# Patient Record
Sex: FEMALE | Race: WHITE | Employment: UNEMPLOYED | URBAN - METROPOLITAN AREA
[De-identification: names, ages, dates, MRNs, and addresses within clinical notes are randomized per-mention and may not be internally consistent; named-entity substitution may affect disease eponyms.]

---

## 2018-08-25 ENCOUNTER — HOSPITAL ENCOUNTER (EMERGENCY)
Age: 43
Discharge: HOME | End: 2018-08-25
Payer: COMMERCIAL

## 2018-08-25 VITALS
DIASTOLIC BLOOD PRESSURE: 54 MMHG | HEART RATE: 78 BPM | WEIGHT: 235 LBS | OXYGEN SATURATION: 97 % | SYSTOLIC BLOOD PRESSURE: 108 MMHG | RESPIRATION RATE: 16 BRPM | TEMPERATURE: 98.3 F

## 2018-08-25 DIAGNOSIS — K08.89 TOOTHACHE: Primary | ICD-10-CM

## 2018-08-25 PROCEDURE — 99212 OFFICE O/P EST SF 10 MIN: CPT

## 2018-08-25 RX ORDER — HYDROCODONE BITARTRATE AND ACETAMINOPHEN 5; 325 MG/1; MG/1
1 TABLET ORAL EVERY 6 HOURS PRN
Qty: 12 TABLET | Refills: 0 | Status: SHIPPED | OUTPATIENT
Start: 2018-08-25 | End: 2018-08-28

## 2018-08-25 RX ORDER — CITALOPRAM 10 MG/1
10 TABLET ORAL DAILY
COMMUNITY

## 2018-08-25 RX ORDER — AMOXICILLIN 500 MG/1
500 CAPSULE ORAL 3 TIMES DAILY
Qty: 30 CAPSULE | Refills: 0 | Status: SHIPPED | OUTPATIENT
Start: 2018-08-25 | End: 2018-09-04

## 2018-08-25 ASSESSMENT — PAIN DESCRIPTION - LOCATION: LOCATION: TEETH

## 2018-08-25 ASSESSMENT — PAIN SCALES - GENERAL: PAINLEVEL_OUTOF10: 9

## 2018-08-25 ASSESSMENT — PAIN DESCRIPTION - DESCRIPTORS: DESCRIPTORS: ACHING

## 2018-08-25 ASSESSMENT — PAIN DESCRIPTION - PAIN TYPE: TYPE: ACUTE PAIN

## 2018-08-25 ASSESSMENT — PAIN DESCRIPTION - ORIENTATION: ORIENTATION: RIGHT;UPPER

## 2018-08-25 NOTE — ED PROVIDER NOTES
eye subscore is 4. GCS verbal subscore is 5. GCS motor subscore is 6. Skin: Skin is warm and dry. No abrasion and no rash noted. Nursing note and vitals reviewed. Procedures    MDM         --------------------------------------------- PAST HISTORY ---------------------------------------------  Past Medical History:  has no past medical history on file. Past Surgical History:  has no past surgical history on file. Social History:      Family History: family history is not on file. The patients home medications have been reviewed. Allergies: Patient has no allergy information on record.    -------------------------------------------------- RESULTS -------------------------------------------------  No results found for this visit on 08/25/18. No orders to display       ------------------------- NURSING NOTES AND VITALS REVIEWED ---------------------------   The nursing notes within the ED encounter and vital signs as below have been reviewed. There were no vitals taken for this visit. Oxygen Saturation Interpretation: Normal      ------------------------------------------ PROGRESS NOTES ------------------------------------------   I have spoken with the patient and discussed todays results, in addition to providing specific details for the plan of care and counseling regarding the diagnosis and prognosis. Their questions are answered at this time and they are agreeable with the plan.      --------------------------------- ADDITIONAL PROVIDER NOTES ---------------------------------     This patient is stable for discharge. I have shared the specific conditions for return, as well as the importance of follow-up. * NOTE: This report was transcribed using voice recognition software.  Every effort was made to ensure accuracy; however, inadvertent computerized transcription errors may be present.    --------------------------------- IMPRESSION AND DISPOSITION

## 2022-05-15 ENCOUNTER — HOSPITAL ENCOUNTER (EMERGENCY)
Age: 47
Discharge: HOME | End: 2022-05-15

## 2022-05-15 VITALS
OXYGEN SATURATION: 100 % | WEIGHT: 218 LBS | RESPIRATION RATE: 16 BRPM | DIASTOLIC BLOOD PRESSURE: 91 MMHG | TEMPERATURE: 97.5 F | SYSTOLIC BLOOD PRESSURE: 140 MMHG | HEART RATE: 88 BPM

## 2022-05-15 DIAGNOSIS — K08.89 PAIN, DENTAL: Primary | ICD-10-CM

## 2022-05-15 PROCEDURE — 99211 OFF/OP EST MAY X REQ PHY/QHP: CPT

## 2022-05-15 RX ORDER — AMOXICILLIN 875 MG/1
875 TABLET, COATED ORAL 2 TIMES DAILY
Qty: 20 TABLET | Refills: 0 | Status: SHIPPED | OUTPATIENT
Start: 2022-05-15 | End: 2022-05-25

## 2022-05-15 ASSESSMENT — PAIN - FUNCTIONAL ASSESSMENT: PAIN_FUNCTIONAL_ASSESSMENT: NONE - DENIES PAIN

## 2022-05-15 NOTE — ED PROVIDER NOTES
[id number] [facility]  Department of Emergency Medicine  UC Encounter Note  5/15/22   10:16 AM EDT      NAME: Tash Medina  :  1975  MRN:  [id number]    Chief Complaint: Dental Pain (WOKE UP WITH SWOLLEN CHEEK,GUMS FEEL FUNNY, HAD A CAVITY, RIGHT UPPER TOOTH)      This is a 43-year-old female the presents to urgent care complaining of right cheek swelling discomfort and some dental tenderness and some gum discomfort as well for the past couple days. She denies any difficulty breathing or swallowing. No fevers or chills. No chest pain. On first contact patient she appears to be in no acute distress. Review of Systems  Pertinent positives and negatives are stated within HPI, all other systems reviewed and are negative. Physical Exam  Vitals and nursing note reviewed. Constitutional:       Appearance: She is well-developed. HENT:      Head: Normocephalic and atraumatic. Jaw: There is normal jaw occlusion. Right Ear: Hearing and external ear normal.      Left Ear: Hearing and external ear normal.      Nose: Nose normal.      Mouth/Throat:      Mouth: Mucous membranes are moist. No injury or angioedema. Dentition: Dental tenderness and gingival swelling present. No dental caries, dental abscesses or gum lesions. Pharynx: Uvula midline. No pharyngeal swelling, oropharyngeal exudate or posterior oropharyngeal erythema. Comments: Oropharynx is patent. Eyes:      General: Lids are normal.      Conjunctiva/sclera: Conjunctivae normal.      Pupils: Pupils are equal, round, and reactive to light. Cardiovascular:      Rate and Rhythm: Normal rate and regular rhythm. Heart sounds: Normal heart sounds. No murmur heard. Pulmonary:      Effort: Pulmonary effort is normal.      Breath sounds: Normal breath sounds. Abdominal:      General: Bowel sounds are normal.      Palpations: Abdomen is soft. Abdomen is not rigid. Tenderness:  There is no abdominal tenderness. There is no guarding or rebound. Musculoskeletal:      Cervical back: Normal range of motion and neck supple. Skin:     General: Skin is warm and dry. Findings: No abrasion or rash. Neurological:      General: No focal deficit present. Mental Status: She is alert and oriented to person, place, and time. GCS: GCS eye subscore is 4. GCS verbal subscore is 5. GCS motor subscore is 6. Cranial Nerves: No cranial nerve deficit. Sensory: No sensory deficit. Coordination: Coordination normal.      Gait: Gait normal.         Procedures    MDM  Number of Diagnoses or Management Options  Pain, dental  Diagnosis management comments: Patient is in no acute distress. We will place her on an antibiotic amoxicillin she is taken this in the past and she has had no problems. She had amoxicillin several years ago for a dental problem and had no problems she states. Continue ibuprofen. Follow-up with her dentist or see list with dental clinic. Instructions given.           --------------------------------------------- PAST HISTORY ---------------------------------------------  Past Medical History:  has no past medical history on file. Past Surgical History:  has a past surgical history that includes Cholecystectomy;  section; and hernia repair. Social History:  reports that she has never smoked. She does not have any smokeless tobacco history on file. She reports that she does not drink alcohol and does not use drugs. Family History: family history is not on file. The patients home medications have been reviewed. Allergies: Keflex [cephalexin]    -------------------------------------------------- RESULTS -------------------------------------------------  No results found for this visit on 05/15/22.   No orders to display       ------------------------- NURSING NOTES AND VITALS REVIEWED ---------------------------   The nursing notes within the ED encounter and vital signs as below have been reviewed. BP (!) 140/91   Pulse 88   Temp 97.5 °F (36.4 °C)   Resp 16   Wt 218 lb (98.9 kg)   SpO2 100%   Oxygen Saturation Interpretation: Normal      ------------------------------------------ PROGRESS NOTES ------------------------------------------   I have spoken with the patient and discussed todays results, in addition to providing specific details for the plan of care and counseling regarding the diagnosis and prognosis. Their questions are answered at this time and they are agreeable with the plan.      --------------------------------- ADDITIONAL PROVIDER NOTES ---------------------------------     This patient is stable for discharge. I have shared the specific conditions for return, as well as the importance of follow-up. * NOTE: This report was transcribed using voice recognition software. Every effort was made to ensure accuracy; however, inadvertent computerized transcription errors may be present.    --------------------------------- IMPRESSION AND DISPOSITION ---------------------------------    IMPRESSION  1.  Pain, dental        DISPOSITION  Disposition: Discharge to home  Patient condition is good        Berenice Rodriguez PA-C  05/15/22 1029

## 2022-09-14 LAB
CHOLESTEROL, TOTAL: 209 MG/DL (ref 0–199)
GLUCOSE BLD-MCNC: 239 MG/DL (ref 74–107)
HDLC SERPL-MCNC: 40 MG/DL
LDL CHOLESTEROL CALCULATED: 140 MG/DL (ref 0–99)
TRIGL SERPL-MCNC: 144 MG/DL (ref 0–149)

## 2023-02-17 ENCOUNTER — HOSPITAL ENCOUNTER (EMERGENCY)
Age: 48
Discharge: HOME | End: 2023-02-17
Attending: EMERGENCY MEDICINE
Payer: COMMERCIAL

## 2023-02-17 VITALS
RESPIRATION RATE: 20 BRPM | TEMPERATURE: 98.2 F | HEART RATE: 77 BPM | DIASTOLIC BLOOD PRESSURE: 93 MMHG | WEIGHT: 212 LBS | SYSTOLIC BLOOD PRESSURE: 131 MMHG | OXYGEN SATURATION: 98 %

## 2023-02-17 DIAGNOSIS — R89.9 ABNORMAL LABORATORY TEST RESULT: Primary | ICD-10-CM

## 2023-02-17 DIAGNOSIS — E11.9 TYPE 2 DIABETES MELLITUS WITHOUT COMPLICATION, WITHOUT LONG-TERM CURRENT USE OF INSULIN (HCC): ICD-10-CM

## 2023-02-17 DIAGNOSIS — R00.2 PALPITATIONS: ICD-10-CM

## 2023-02-17 LAB
ALBUMIN SERPL-MCNC: 4 G/DL (ref 3.5–5.2)
ALP BLD-CCNC: 73 U/L (ref 35–104)
ALT SERPL-CCNC: 41 U/L (ref 0–32)
ANION GAP SERPL CALCULATED.3IONS-SCNC: 13 MMOL/L (ref 7–16)
AST SERPL-CCNC: 23 U/L (ref 0–31)
BASOPHILS ABSOLUTE: 0.06 E9/L (ref 0–0.2)
BASOPHILS RELATIVE PERCENT: 0.8 % (ref 0–2)
BILIRUB SERPL-MCNC: 0.4 MG/DL (ref 0–1.2)
BILIRUBIN URINE: NEGATIVE
BLOOD, URINE: NEGATIVE
BUN BLDV-MCNC: 14 MG/DL (ref 6–20)
CALCIUM SERPL-MCNC: 9.2 MG/DL (ref 8.6–10.2)
CHLORIDE BLD-SCNC: 98 MMOL/L (ref 98–107)
CHP ED QC CHECK: NORMAL
CLARITY: CLEAR
CO2: 20 MMOL/L (ref 22–29)
COLOR: YELLOW
CREAT SERPL-MCNC: 0.6 MG/DL (ref 0.5–1)
EKG ATRIAL RATE: 76 BPM
EKG P AXIS: 41 DEGREES
EKG P-R INTERVAL: 220 MS
EKG Q-T INTERVAL: 374 MS
EKG QRS DURATION: 92 MS
EKG QTC CALCULATION (BAZETT): 420 MS
EKG R AXIS: -2 DEGREES
EKG T AXIS: 3 DEGREES
EKG VENTRICULAR RATE: 76 BPM
EOSINOPHILS ABSOLUTE: 0.18 E9/L (ref 0.05–0.5)
EOSINOPHILS RELATIVE PERCENT: 2.3 % (ref 0–6)
GFR SERPL CREATININE-BSD FRML MDRD: >60 ML/MIN/1.73
GLUCOSE BLD-MCNC: 244 MG/DL
GLUCOSE BLD-MCNC: 263 MG/DL (ref 74–99)
GLUCOSE URINE: >=1000 MG/DL
HCT VFR BLD CALC: 40.8 % (ref 34–48)
HEMOGLOBIN: 13.4 G/DL (ref 11.5–15.5)
IMMATURE GRANULOCYTES #: 0.05 E9/L
IMMATURE GRANULOCYTES %: 0.6 % (ref 0–5)
KETONES, URINE: 15 MG/DL
LEUKOCYTE ESTERASE, URINE: NEGATIVE
LYMPHOCYTES ABSOLUTE: 1.73 E9/L (ref 1.5–4)
LYMPHOCYTES RELATIVE PERCENT: 22 % (ref 20–42)
MAGNESIUM: 1.9 MG/DL (ref 1.6–2.6)
MCH RBC QN AUTO: 26.6 PG (ref 26–35)
MCHC RBC AUTO-ENTMCNC: 32.8 % (ref 32–34.5)
MCV RBC AUTO: 81.1 FL (ref 80–99.9)
METER GLUCOSE: 244 MG/DL (ref 74–99)
MONOCYTES ABSOLUTE: 0.6 E9/L (ref 0.1–0.95)
MONOCYTES RELATIVE PERCENT: 7.6 % (ref 2–12)
NEUTROPHILS ABSOLUTE: 5.26 E9/L (ref 1.8–7.3)
NEUTROPHILS RELATIVE PERCENT: 66.7 % (ref 43–80)
NITRITE, URINE: NEGATIVE
PDW BLD-RTO: 13.5 FL (ref 11.5–15)
PH UA: 6 (ref 5–9)
PLATELET # BLD: 458 E9/L (ref 130–450)
PMV BLD AUTO: 9.4 FL (ref 7–12)
POTASSIUM SERPL-SCNC: 3.5 MMOL/L (ref 3.5–5)
PROTEIN UA: NEGATIVE MG/DL
RBC # BLD: 5.03 E12/L (ref 3.5–5.5)
SODIUM BLD-SCNC: 131 MMOL/L (ref 132–146)
SPECIFIC GRAVITY UA: <=1.005 (ref 1–1.03)
T4 FREE: 1.2 NG/DL (ref 0.93–1.7)
TOTAL PROTEIN: 7.4 G/DL (ref 6.4–8.3)
TSH SERPL DL<=0.05 MIU/L-ACNC: 4.59 UIU/ML (ref 0.27–4.2)
UROBILINOGEN, URINE: 0.2 E.U./DL
WBC # BLD: 7.9 E9/L (ref 4.5–11.5)

## 2023-02-17 PROCEDURE — 84443 ASSAY THYROID STIM HORMONE: CPT

## 2023-02-17 PROCEDURE — 81003 URINALYSIS AUTO W/O SCOPE: CPT

## 2023-02-17 PROCEDURE — 84439 ASSAY OF FREE THYROXINE: CPT

## 2023-02-17 PROCEDURE — 99284 EMERGENCY DEPT VISIT MOD MDM: CPT

## 2023-02-17 PROCEDURE — 2580000003 HC RX 258: Performed by: STUDENT IN AN ORGANIZED HEALTH CARE EDUCATION/TRAINING PROGRAM

## 2023-02-17 PROCEDURE — 93005 ELECTROCARDIOGRAM TRACING: CPT

## 2023-02-17 PROCEDURE — 82962 GLUCOSE BLOOD TEST: CPT

## 2023-02-17 PROCEDURE — 83735 ASSAY OF MAGNESIUM: CPT

## 2023-02-17 PROCEDURE — 85025 COMPLETE CBC W/AUTO DIFF WBC: CPT

## 2023-02-17 PROCEDURE — 80053 COMPREHEN METABOLIC PANEL: CPT

## 2023-02-17 RX ORDER — 0.9 % SODIUM CHLORIDE 0.9 %
1000 INTRAVENOUS SOLUTION INTRAVENOUS ONCE
Status: COMPLETED | OUTPATIENT
Start: 2023-02-17 | End: 2023-02-17

## 2023-02-17 RX ADMIN — SODIUM CHLORIDE 1000 ML: 9 INJECTION, SOLUTION INTRAVENOUS at 08:20

## 2023-02-17 ASSESSMENT — ENCOUNTER SYMPTOMS
COUGH: 0
ABDOMINAL PAIN: 0
RHINORRHEA: 0
BACK PAIN: 0
NAUSEA: 0
VOMITING: 0
DIARRHEA: 0
SHORTNESS OF BREATH: 0

## 2023-02-17 ASSESSMENT — LIFESTYLE VARIABLES
HOW OFTEN DO YOU HAVE A DRINK CONTAINING ALCOHOL: NEVER
HOW MANY STANDARD DRINKS CONTAINING ALCOHOL DO YOU HAVE ON A TYPICAL DAY: PATIENT DOES NOT DRINK

## 2023-02-17 ASSESSMENT — PAIN - FUNCTIONAL ASSESSMENT: PAIN_FUNCTIONAL_ASSESSMENT: NONE - DENIES PAIN

## 2023-02-17 NOTE — ED PROVIDER NOTES
[facility]  ED Provider Note  Department of Emergency Medicine     ED Room:       Written by: Elham Ornelas DO  Patient Name: Shama Root  Attending Provider: Daphne Madrid DO  Admit Date: 2023  6:34 AM  MRN: [id number]    : 1975        Chief Complaint   Patient presents with    Abnormal Lab     Drawn yesterday K was 5.9.  diagnosed yesterday with type 2 diabetes     - Chief complaint    HPI   Shama Root is a 56 y.o. female presenting to the ED for evaluation of Abnormal Lab (Drawn yesterday K was 5.9.  diagnosed yesterday with type 2 diabetes )      History obtained from patient. Patient is a 60-year-old female presenting to the ED for evaluation of abnormal labs; patient states she saw on her MyChart blood work yesterday showed a potassium of 5.9. She is just recently within the past 1 or 2 days being evaluated for likely new onset diabetes; she had a hemoglobin A1c of 10.3 yesterday and has an appointment with her PCP for follow-up this afternoon. 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  Does report recently she has been feeling intermittent heart palpitations, fatigue, and tingling/numbness of her fingers episodically. She denies any chest pain, shortness of breath, abdominal pain, nausea or vomiting or diarrhea. Denies any palpitations at this time. Does report increased thirst.    Review of Systems   Constitutional:  Negative for chills and fever. HENT:  Negative for congestion and rhinorrhea. Eyes:  Negative for visual disturbance. Respiratory:  Negative for cough and shortness of breath. Cardiovascular:  Positive for palpitations. Negative for chest pain. Gastrointestinal:  Negative for abdominal pain, diarrhea, nausea and vomiting. Endocrine: Positive for polydipsia. Genitourinary:  Negative for dysuria and frequency. Musculoskeletal:  Negative for back pain and myalgias. Skin:  Negative for rash and wound. Neurological:  Positive for numbness (\"tingling\" of fevers episodically recently). Negative for weakness and headaches. Psychiatric/Behavioral:  Negative for confusion. All other systems reviewed and are negative. Physical Exam  Vitals and nursing note reviewed. Constitutional:       General: She is not in acute distress. Appearance: She is not toxic-appearing. HENT:      Head: Normocephalic and atraumatic. Right Ear: External ear normal.      Left Ear: External ear normal.      Nose: Nose normal. No rhinorrhea. Mouth/Throat:      Mouth: Mucous membranes are moist.      Pharynx: Oropharynx is clear. Eyes:      Extraocular Movements: Extraocular movements intact. Conjunctiva/sclera: Conjunctivae normal.      Pupils: Pupils are equal, round, and reactive to light. Cardiovascular:      Rate and Rhythm: Normal rate and regular rhythm. Pulses: Normal pulses. Heart sounds: Normal heart sounds. Pulmonary:      Effort: Pulmonary effort is normal. No respiratory distress. Breath sounds: Normal breath sounds. No wheezing or rales. Abdominal:      General: Bowel sounds are normal.      Palpations: Abdomen is soft. Tenderness: There is no abdominal tenderness. There is no guarding. Musculoskeletal:         General: Normal range of motion. Cervical back: Normal range of motion and neck supple. Right lower leg: No edema. Left lower leg: No edema. Skin:     General: Skin is warm and dry. Capillary Refill: Capillary refill takes less than 2 seconds. Coloration: Skin is not jaundiced or pale. Neurological:      General: No focal deficit present. Mental Status: She is alert and oriented to person, place, and time. Sensory: No sensory deficit. Motor: No weakness.    Psychiatric:         Mood and Affect: Mood normal.         Behavior: Behavior normal.        Procedures       Medical Decision Making: This is a 56 y.o. female presenting for evaluation of potassium 5.9 see on labs drawn outpatient yesterday; is seeing her doctor today for newly diagnosed diabetes, just had labwork done yesterday. Has been having occasional heart palpitations recently as well, and tingling of hands and feet. Please see HPI for further details, additional history and chart review. On my evaluation today, patient is alert, oriented, NAD, nontoxic in appearance. Vitals are stable. Exam findings are as documented above; no focal deficits, heart RRR, abdomen soft NT, lungs CTA b/l. She is overall well-appearing. Labs reviewed, potassium is 3.5, normal; CMP glucose 263, otherwise WNLs; TSH elevated at 4.59, however T4 WNLs at 1.2; UA negative for UTI; CBC WNLs. Patient has an appointment with her PCP today and is overall well-appearing, labs generally unremarkable. Results and plan for discharge were discussed, she voiced understanding and is amenable. Strict return precautions were discussed. While not exhaustive, the following diagnoses and their severity were considered: Hyperglycemia, symptoms secondary to newly diagnosed diabetes untreated; peripheral neuropathy, dehydration, electrolyte abnormalities, thyroid disease. Independent interpretation of Laboratory tests by Emanuel Robins DO: potassium is 3.5; CMP glucose 263, otherwise WNLs; TSH elevated at 4.59, however T4 WNLs at 1.2; UA negative for UTI; CBC WNLs. Independent interpretation of Radiology tests by Emanuel Robins DO: None      EKG reviewed and interpreted by me, TIME 0703: This EKG is signed by emergency department physician. Sinus rhythm with first-degree AVB; normal axis, T wave inversion lead III, nonspecific T wave abnormality lead aVF; no acute ischemic changes; rate 76, , QTc 420; no previous EKG available for comparison.       Labs & imaging were reviewed and interpreted, see RESULTS. I have personally reviewed all laboratory and imaging results for this patient. Are there any additional factors to consider that affect care (uninsured, homeless, illiterate, history from another source, etc.) (If yes, which ones). No      Name and Route of medications administered in the ED:  Medications   0.9 % sodium chloride bolus (has no administration in time range)           Re-Evaluations:  ED Course as of 23 0815      3765 EKG @ 0703: This EKG is signed and interpreted by Dr Cary Dyer. Rate: 76  Rhythm: Sinus  Interpretation: Sinus rhythm with first-degree AV block, IN is 220, QRS is 92, QTc is 420, no evidence of ST elevation, nonspecific no prior for comparison  Comparison: no previous EKG available   [ME]      ED Course User Index  [ME] Desiree Wilson DO         Please see ED course for any additional MDM documentation. I have discussed this patient with my attending, who has seen the patient and agrees with this disposition. Patient was seen and evaluated by myself and my attending Desiree Wilson DO. Assessment and Plan discussed with attending provider, please see attestation for final plan of care.           --------------------------------------------- PAST HISTORY ---------------------------------------------  Past Medical History:  has no past medical history on file. Past Surgical History:  has a past surgical history that includes Cholecystectomy;  section; and hernia repair. Social History:  reports that she has never smoked. She does not have any smokeless tobacco history on file. She reports that she does not drink alcohol and does not use drugs. Family History: family history is not on file. Unless otherwise noted, family history is non contributory. The patients home medications have been reviewed.     Allergies: Keflex [cephalexin]    -------------------------------------------------- RESULTS -------------------------------------------------  Labs:  Results for orders placed or performed during the hospital encounter of 02/17/23   CMP   Result Value Ref Range    Sodium 131 (L) 132 - 146 mmol/L    Potassium 3.5 3.5 - 5.0 mmol/L    Chloride 98 98 - 107 mmol/L    CO2 20 (L) 22 - 29 mmol/L    Anion Gap 13 7 - 16 mmol/L    Glucose 263 (H) 74 - 99 mg/dL    BUN 14 6 - 20 mg/dL    Creatinine 0.6 0.5 - 1.0 mg/dL    Est, Glom Filt Rate >60 >=60 mL/min/1.73    Calcium 9.2 8.6 - 10.2 mg/dL    Total Protein 7.4 6.4 - 8.3 g/dL    Albumin 4.0 3.5 - 5.2 g/dL    Total Bilirubin 0.4 0.0 - 1.2 mg/dL    Alkaline Phosphatase 73 35 - 104 U/L    ALT 41 (H) 0 - 32 U/L    AST 23 0 - 31 U/L   CBC with Auto Differential   Result Value Ref Range    WBC 7.9 4.5 - 11.5 E9/L    RBC 5.03 3.50 - 5.50 E12/L    Hemoglobin 13.4 11.5 - 15.5 g/dL    Hematocrit 40.8 34.0 - 48.0 %    MCV 81.1 80.0 - 99.9 fL    MCH 26.6 26.0 - 35.0 pg    MCHC 32.8 32.0 - 34.5 %    RDW 13.5 11.5 - 15.0 fL    Platelets 077 (H) 107 - 450 E9/L    MPV 9.4 7.0 - 12.0 fL    Neutrophils % 66.7 43.0 - 80.0 %    Immature Granulocytes % 0.6 0.0 - 5.0 %    Lymphocytes % 22.0 20.0 - 42.0 %    Monocytes % 7.6 2.0 - 12.0 %    Eosinophils % 2.3 0.0 - 6.0 %    Basophils % 0.8 0.0 - 2.0 %    Neutrophils Absolute 5.26 1.80 - 7.30 E9/L    Immature Granulocytes # 0.05 E9/L    Lymphocytes Absolute 1.73 1.50 - 4.00 E9/L    Monocytes Absolute 0.60 0.10 - 0.95 E9/L    Eosinophils Absolute 0.18 0.05 - 0.50 E9/L    Basophils Absolute 0.06 0.00 - 0.20 E9/L   Urinalysis   Result Value Ref Range    Color, UA Yellow Straw/Yellow    Clarity, UA Clear Clear    Glucose, Ur >=1000 (A) Negative mg/dL    Bilirubin Urine Negative Negative    Ketones, Urine 15 (A) Negative mg/dL    Specific Gravity, UA <=1.005 1.005 - 1.030    Blood, Urine Negative Negative    pH, UA 6.0 5.0 - 9.0    Protein, UA Negative Negative mg/dL Urobilinogen, Urine 0.2 <2.0 E.U./dL    Nitrite, Urine Negative Negative    Leukocyte Esterase, Urine Negative Negative   TSH   Result Value Ref Range    TSH 4.590 (H) 0.270 - 4.200 uIU/mL   Magnesium   Result Value Ref Range    Magnesium 1.9 1.6 - 2.6 mg/dL   POCT Glucose   Result Value Ref Range    Glucose 244 mg/dL    QC OK? OK    POCT Glucose   Result Value Ref Range    Meter Glucose 244 (H) 74 - 99 mg/dL   EKG 12 Lead   Result Value Ref Range    Ventricular Rate 76 BPM    Atrial Rate 76 BPM    P-R Interval 220 ms    QRS Duration 92 ms    Q-T Interval 374 ms    QTc Calculation (Bazett) 420 ms    P Axis 41 degrees    R Axis -2 degrees    T Axis 3 degrees       Radiology:  No orders to display       Interpreted by the radiologist unless otherwise specified.      ------------------------- NURSING NOTES AND VITALS REVIEWED ---------------------------  Date / Time Roomed:  2/17/2023  6:34 AM  ED Bed Assignment:  14A/14A-14    The nursing notes within the ED encounter and vital signs as below have been reviewed by myself. BP (!) 131/93   Pulse 77   Temp 98.2 °F (36.8 °C)   Resp 20   Wt 212 lb (96.2 kg)   SpO2 98%   Oxygen Saturation Interpretation: Normal    The patients available past medical records and past encounters were reviewed. ------------------------------------------ PROGRESS NOTES ------------------------------------------  9:03 AM EST  I have spoken with the patient and discussed todays results, in addition to providing specific details for the plan of care and counseling regarding the diagnosis and prognosis. Their questions are answered at this time and they are agreeable with the plan. I discussed at length with them reasons for immediate return here for re evaluation.  They will followup with their primary care physician by going to their office today.      --------------------------------- ADDITIONAL PROVIDER NOTES ---------------------------------  At this time the patient is without objective evidence of an acute process requiring hospitalization or inpatient management. They have remained hemodynamically stable throughout their entire ED visit and are stable for discharge with outpatient follow-up. The plan has been discussed in detail and they are aware of the specific conditions for emergent return, as well as the importance of follow-up. New Prescriptions    No medications on file       Diagnosis:  1. Abnormal laboratory test result    2. Palpitations    3. Type 2 diabetes mellitus without complication, without long-term current use of insulin (HCC)        Disposition:  Patient's disposition: Discharge to home  Patient's condition is stable. Sahara Zuñiga D.O. PGY-3     Resident Physician     Emergency Medicine      2/17/2023 8:15 AM      NOTE: This report was transcribed using voice recognition software. Every effort was made to ensure accuracy; however, inadvertent computerized transcription errors may be present             Sahara Zuñiga DO  Resident  02/18/23 1244        ATTENDING PROVIDER ATTESTATION:     Julianna Dus presented to the emergency department for evaluation of Abnormal Lab (Drawn yesterday K was 5.9.  diagnosed yesterday with type 2 diabetes )    I have reviewed and discussed the case, including pertinent history (medical, surgical, family and social) and exam findings with the Resident and the Nurse assigned to Julianna Ahumada. I have personally performed and/or participated in the history, exam, medical decision making, and procedures and agree with all pertinent clinical information. I have reviewed my findings and recommendations with Julianna Ahumada and members of family present at the time of disposition. I have personally reviewed all laboratory radiology results from today's visit. I have personally reviewed and agree with resident interpretation of EKG for all EKGs from today's visit.      MDM:     I, Dr. Gerry Kirk am the primary provider of record    Medical Decision Making    EKG is ordered to have documentation of patient's current rhythm, and to rule out any obvious acute cardiac illnesses such as ACS. Additionally, QT interval may be of use in decision making regarding any medications administered here in the ED. Patient is placed on cardiac monitor and continuous pulse ox for monitoring. CBC is ordered to evaluate for any signs of infection or inflammation by obtaining a WBC count, or any signs of acute anemia by interpreting hemoglobin. CMP was ordered to evaluate for any electrolyte imbalances, kidney function, or any elevations in anion gap. Thyroid studies ordered to evaluate for hyperthyroidism or hypothyroidism. Amount and/or Complexity of Data Reviewed  Labs: ordered. Decision-making details documented in ED Course. ECG/medicine tests: ordered and independent interpretation performed. Decision-making details documented in ED Course. Risk  Prescription drug management. My findings/plan: The primary encounter diagnosis was Abnormal laboratory test result. Diagnoses of Palpitations and Type 2 diabetes mellitus without complication, without long-term current use of insulin ([facility] [address] were also pertinent to this visit.   Discharge Medication List as of 2/17/2023  9:25 AM        DO Jerardo Bell DO  02/18/23 [id number]

## 2023-02-21 LAB
EKG ATRIAL RATE: 76 BPM
EKG P AXIS: 41 DEGREES
EKG P-R INTERVAL: 220 MS
EKG Q-T INTERVAL: 374 MS
EKG QRS DURATION: 92 MS
EKG QTC CALCULATION (BAZETT): 420 MS
EKG R AXIS: -2 DEGREES
EKG T AXIS: 3 DEGREES
EKG VENTRICULAR RATE: 76 BPM

## 2023-06-20 LAB
CHOLESTEROL, TOTAL: 188 MG/DL (ref 0–199)
GLUCOSE SERPL-MCNC: 95 MG/DL (ref 74–107)
HDLC SERPL-MCNC: 40 MG/DL
LDLC SERPL CALC-MCNC: 121 MG/DL (ref 0–99)
TRIGL SERPL-MCNC: 135 MG/DL (ref 0–149)

## 2023-07-19 ENCOUNTER — NURSE TRIAGE (OUTPATIENT)
Dept: OTHER | Facility: CLINIC | Age: 48
End: 2023-07-19

## 2023-07-19 ENCOUNTER — HOSPITAL ENCOUNTER (EMERGENCY)
Age: 48
Discharge: HOME | End: 2023-07-19
Payer: COMMERCIAL

## 2023-07-19 VITALS
SYSTOLIC BLOOD PRESSURE: 156 MMHG | OXYGEN SATURATION: 100 % | RESPIRATION RATE: 18 BRPM | TEMPERATURE: 98.8 F | HEART RATE: 89 BPM | WEIGHT: 212 LBS | DIASTOLIC BLOOD PRESSURE: 82 MMHG
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
## 2023-07-19 PROCEDURE — 99284 EMERGENCY DEPT VISIT MOD MDM: CPT

## 2023-07-19 PROCEDURE — 6360000002 HC RX W HCPCS

## 2023-07-19 PROCEDURE — 90715 TDAP VACCINE 7 YRS/> IM: CPT

## 2023-07-19 PROCEDURE — 90471 IMMUNIZATION ADMIN: CPT

## 2023-07-19 PROCEDURE — 6370000000 HC RX 637 (ALT 250 FOR IP): Performed by: PHYSICIAN ASSISTANT

## 2023-07-19 RX ORDER — AMOXICILLIN AND CLAVULANATE POTASSIUM 875; 125 MG/1; MG/1
1 TABLET, FILM COATED ORAL ONCE
Status: COMPLETED | OUTPATIENT
Start: 2023-07-19 | End: 2023-07-19

## 2023-07-19 RX ORDER — AMOXICILLIN AND CLAVULANATE POTASSIUM 875; 125 MG/1; MG/1
1 TABLET, FILM COATED ORAL 2 TIMES DAILY
Qty: 20 TABLET | Refills: 0 | Status: SHIPPED | OUTPATIENT
Start: 2023-07-19 | End: 2023-07-29

## 2023-07-19 RX ORDER — TETANUS AND DIPHTHERIA TOXOIDS ADSORBED 2; 2 [LF]/.5ML; [LF]/.5ML
0.5 INJECTION INTRAMUSCULAR ONCE
Status: DISCONTINUED | OUTPATIENT
Start: 2023-07-19 | End: 2023-07-19

## 2023-07-19 RX ORDER — BACITRACIN ZINC 500 [USP'U]/G
OINTMENT TOPICAL ONCE
Status: COMPLETED | OUTPATIENT
Start: 2023-07-19 | End: 2023-07-19

## 2023-07-19 RX ORDER — ACETAMINOPHEN AND CODEINE PHOSPHATE 300; 30 MG/1; MG/1
1 TABLET ORAL EVERY 4 HOURS PRN
Qty: 18 TABLET | Refills: 0 | Status: SHIPPED | OUTPATIENT
Start: 2023-07-19 | End: 2023-07-22

## 2023-07-19 RX ADMIN — AMOXICILLIN AND CLAVULANATE POTASSIUM 1 TABLET: 875; 125 TABLET, FILM COATED ORAL at 13:15

## 2023-07-19 RX ADMIN — BACITRACIN ZINC: 500 OINTMENT TOPICAL at 13:13

## 2023-07-19 RX ADMIN — TETANUS TOXOID, REDUCED DIPHTHERIA TOXOID AND ACELLULAR PERTUSSIS VACCINE, ADSORBED 0.5 ML: 5; 2.5; 8; 8; 2.5 SUSPENSION INTRAMUSCULAR at 13:20

## 2023-07-19 ASSESSMENT — PAIN DESCRIPTION - FREQUENCY
FREQUENCY: CONTINUOUS
FREQUENCY: INTERMITTENT

## 2023-07-19 ASSESSMENT — PAIN DESCRIPTION - ONSET
ONSET: ON-GOING
ONSET: SUDDEN

## 2023-07-19 ASSESSMENT — PAIN DESCRIPTION - ORIENTATION
ORIENTATION: RIGHT;LEFT
ORIENTATION: LEFT

## 2023-07-19 ASSESSMENT — PAIN SCALES - GENERAL
PAINLEVEL_OUTOF10: 7
PAINLEVEL_OUTOF10: 10

## 2023-07-19 ASSESSMENT — PAIN DESCRIPTION - LOCATION: LOCATION: LEG

## 2023-07-19 ASSESSMENT — PAIN DESCRIPTION - DESCRIPTORS
DESCRIPTORS: ACHING;SHARP;SORE;TENDER
DESCRIPTORS: ACHING

## 2023-07-19 ASSESSMENT — PAIN DESCRIPTION - PAIN TYPE
TYPE: ACUTE PAIN
TYPE: ACUTE PAIN

## 2023-07-19 ASSESSMENT — PAIN - FUNCTIONAL ASSESSMENT
PAIN_FUNCTIONAL_ASSESSMENT: 0-10
PAIN_FUNCTIONAL_ASSESSMENT: PREVENTS OR INTERFERES SOME ACTIVE ACTIVITIES AND ADLS
PAIN_FUNCTIONAL_ASSESSMENT: 0-10

## 2023-07-19 NOTE — ED PROVIDER NOTES
Independent [facility] Visit. Department of Emergency Medicine   ED  Provider Note  Admit Date/RoomTime: 7/19/2023 12:27 PM  ED Room: 13/13          HPI:  7/19/23, Time: 12:59 PM EDT  . 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 History/Exam Limitations: none. Vish Jett is a 56 y.o. old female presenting to the emergency department for a dog bite to left lower leg, right breast, left forearm, right posterior thigh, which occured several minute(s) prior to arrival.  Since onset the symptoms have been persistent. 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 Patient reports that she was trying to pull the dog off of her when then and again it bit her right breast as well as the left forearm and posterior right thigh. Patient was able to exit the home will be getting into her vehicle to safety. States that she thinks her last tetanus immunization was around 2018 but unsure. Owner reports that the last rabies immunization was in August 2021. Patient denies any head injury. Patient denies all other symptoms and injuries at this time. Symptoms:    Pain:   yes: left lower leg. Redness:   no.     Pruritis:   no.     Rash:   no.     Swelling:   yes: mild left lower leg and left forearm. Laceration:   no.     Abrasion:   yes: right breast.     Pustule:   no.     Puncture:   yes. Other:   N/A. Tetanus Status:  see above. If Animal Related, Then;                   Abnormal Behavior Witnessed:  No.                  Geographic Location Where Bitten:  Client's home. Immunization Status of Animal:  Immune. Associated Signs & Symptoms:    Fever/Chills:   no.     Swelling:   yes: left forearm and left lower leg.      Drainage:   no.

## 2023-07-19 NOTE — ED NOTES
Pt received wound care at multipe bite sites, pt given discharge summary with education on dog bite of multiple sites. Pt aware of her prescription for tylenol and antibiotic.   Pt given copy of workman's comp and info to follow up with occupational health     Carol Sahu RN  07/19/23 5633

## 2023-07-19 NOTE — DISCHARGE INSTRUCTIONS
Please return to the ED with new or worsening symptoms. Follow up with Occupational medicine for recheck.

## 2023-07-19 NOTE — TELEPHONE ENCOUNTER
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 [employer] where injury occurred:in pts home     Location of injury (body part involved): left arm and left leg and right breast     Time of injury: 1115    Last 4 of SSN: 2027    Subjective: Caller states \"pt calling to report a dog attach\"     Current Symptoms: was attached by a cliets pit bull dog while doing a home visit dog just came out of no where and started biting her has numerous puncture wounds to right brest and left arm , leg is bleeding but has not been able to look at that at this time     Pain Severity: 6/10  Temperature: none       What has been tried: nothing currently     LMP:  last week   Pregnant: No    Recommended disposition: Go to ED Now    Care advice provided, caller verbalizes understanding; denies any other questions or concerns. Patient/caller agrees to proceed to   Emergency Department      Reason for Disposition   Any break in skin from BITE (e.g., cut, puncture, or scratch) and PET animal (e.g., dog, cat, or ferret) at risk for RABIES (e.g., sick, stray, unprovoked bite, developing country)    Protocols used:  Animal Bite-ADULT-[place]

## 2023-07-20 ENCOUNTER — HOSPITAL ENCOUNTER (OUTPATIENT)
Dept: GENERAL RADIOLOGY | Age: 48
Discharge: HOME | End: 2023-07-22
Payer: COMMERCIAL

## 2023-07-20 ENCOUNTER — TELEPHONE (OUTPATIENT)
Dept: EMERGENCY DEPT | Age: 48
End: 2023-07-20

## 2023-07-20 ENCOUNTER — HOSPITAL ENCOUNTER (OUTPATIENT)
Age: 48
Discharge: HOME | End: 2023-07-22

## 2023-07-20 DIAGNOSIS — S81.852A: ICD-10-CM

## 2023-07-20 DIAGNOSIS — S51.852A: ICD-10-CM

## 2023-07-20 PROCEDURE — 73090 X-RAY EXAM OF FOREARM: CPT

## 2023-07-20 PROCEDURE — 73590 X-RAY EXAM OF LOWER LEG: CPT

## 2023-08-09 ENCOUNTER — EVALUATION (OUTPATIENT)
Dept: OCCUPATIONAL THERAPY | Age: 48
End: 2023-08-09
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
## 2023-08-09 DIAGNOSIS — S51.852A DOG BITE OF FOREARM, LEFT, INITIAL ENCOUNTER: Primary | ICD-10-CM

## 2023-08-09 NOTE — PROGRESS NOTES
at the crush bit sites. Edema Description/Circumferential Measurements: no significant swelling       Dynamometer (setting 2) IE  8-10-23       Left  35#       Right  59#       Pinch (lateral)         Left  8#       Right  11#       Pinch (tripod)         Left  7.5#       Right  7.5#          Coordination: Tolerance for writing is poor/ able to write 3 characters before switching to the right due to pain . Functional use of the arm is limited by pain and nerve paresthesias. QuickDASH  IE  8-9-23       Disability  59 %             Eval Complexity: Low  Profile and History- Interview, ED notes, xrays  Assessment of Occupational Performance and Identification of Deficits- 5 performance deficits   Clinical Decision Making- no additional modifications required/ no co-morbidities    Rehab Potential:                                 [x] Good  [] Fair  [] Poor        Suggested Professional Referral:       [x] No  [] Yes:  Barriers to Goal Achievement[de-identified]          [] No  [x] Yes: signs of trauma/ nightmares  Domestic Concerns:                           [x] No  [] Yes:       Patient. Education:  [x] Plans/Goals, Risks/Benefits discussed  [] Home exercise program  Method of Education: [x] Verbal  [] Demo  [] Written  Comprehension of Education:  [] Verbalizes understanding. [] Demonstrates understanding. [] Needs Review. [] Demonstrates/verbalizes understanding of HEP/Ed previously given.         Patient understands diagnosis/prognosis and consents to treatment, plan and goals:   [x] Yes    [] No       Time In: 1105            Time Out: 1155                      Timed Code Treatment Minutes: 50 minutes  CODE  Minutes  Units   49195 OT Eval Low 50 1   20137 OT Eval Medium     48307 OT Eval High     92747 Fluidotherapy     20418 Manual     54157 Therapeutic Ex     [address] Therapeutic Activity     29081 ADL/COMP Tech Train     [id number] Neuromuscular Re-Ed     [id number] OrthoManagementTraining     [id number] Paraffin     [id number] Electrical

## 2023-08-11 ENCOUNTER — TREATMENT (OUTPATIENT)
Dept: OCCUPATIONAL THERAPY | Age: 48
End: 2023-08-11

## 2023-08-11 DIAGNOSIS — S51.852A DOG BITE OF FOREARM, LEFT, INITIAL ENCOUNTER: Primary | ICD-10-CM
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
## 2023-08-11 NOTE — PROGRESS NOTES
OCCUPATIONAL THERAPY DAILY NOTE  [facility] THERAPY   [facility] [address] [address]  Dept: [phone]  Loc: [phone]   Kermit Tesfaye OT Fax: [phone]      Date:  2023  Initial Evaluation Date: 23  Evaluating Therapist: Jose Lynn OT/L     Patient Name:  Aj Leon    :  1975    Restrictions/Precautions:   Activity as tolerated, Low fall risk  Diagnosis:  S51.852A- Dog bite of forearm, left                                                         Date of Surgery/Injury:      Insurance/Certification information:  [address]  [facility]-  #[id number]/ [phone]  Plan of care signed (Y/N): N  Visit# / total visits:  approved visits (thru 23)     Referring Practitioner:  Cori Mcneal PA-C  Specific Practitioner Orders: OT evaluate and treat     Assessment of current deficits   [] Functional mobility             [] ADLs           [x] Strength                  [] Cognition   [] Functional transfers           [x] IADLs          [] Safety Awareness  [] Endurance   [x] Fine Motor Coordination    [] Balance      [] Vision/perception    [x] Sensation     [] Gross Motor Coordination [] ROM           [x] Pain                        [] Edema          [] Scar Adhesion/Skin Integrity      OT PLAN OF CARE   OT POC based on physician orders, patient diagnosis and results of clinical assessment     Frequency/Duration: 2x/ week x 6 weeks (Frequency decreased from the recommended 3x/ week due to minimizing further trauma to the nerve injury)     Specific OT Treatment to include:   [x] Instruction in HEP                   Modalities:  [x] Therapeutic Exercise                 [x] Ultrasound               [] Electrical Stimulation/Attended  [x] PROM/Stretching                    [x] Fluidotherapy          [x]  Paraffin                   [x] AAROM  [x] AROM                 [] Iontophoresis:   [] Tendon Glides

## 2023-08-14 ENCOUNTER — TREATMENT (OUTPATIENT)
Dept: OCCUPATIONAL THERAPY | Age: 48
End: 2023-08-14
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
## 2023-08-14 DIAGNOSIS — S51.852A DOG BITE OF FOREARM, LEFT, INITIAL ENCOUNTER: Primary | ICD-10-CM

## 2023-08-14 NOTE — PROGRESS NOTES
OCCUPATIONAL THERAPY DAILY NOTE  [facility] THERAPY   Hyacinth Precise [address] [address]  Dept: [phone]  Loc: [phone]   Lucrecia Bashir OT Fax: [phone]      Date:  2023  Initial Evaluation Date: 23  Evaluating Therapist: Daryle Naas OT/L     Patient Name:  Nazia Desouza    :  1975    Restrictions/Precautions:   Activity as tolerated, Low fall risk  Diagnosis:  S51.852A- Dog bite of forearm, left                                                         Date of Surgery/Injury:      Insurance/Certification information:  [address]  [facility]  #[id number]/ [phone]  Plan of care signed (Y/N): N  Visit# / total visits: 3 / 12 approved visits (thru 23)     Referring Practitioner:  Tracie Humphries PA-C  Specific Practitioner Orders: OT evaluate and treat     Assessment of current deficits   [] Functional mobility             [] ADLs           [x] Strength                  [] Cognition   [] Functional transfers           [x] IADLs          [] Safety Awareness  [] Endurance   [x] Fine Motor Coordination    [] Balance      [] Vision/perception    [x] Sensation     [] Gross Motor Coordination [] ROM           [x] Pain                        [] Edema          [] Scar Adhesion/Skin Integrity      OT PLAN OF CARE   OT POC based on physician orders, patient diagnosis and results of clinical assessment     Frequency/Duration: 2x/ week x 6 weeks (Frequency decreased from the recommended 3x/ week due to minimizing further trauma to the nerve injury)     Specific OT Treatment to include:   [x] Instruction in HEP                   Modalities:  [x] Therapeutic Exercise                 [x] Ultrasound               [] Electrical Stimulation/Attended  [x] PROM/Stretching                    [x] Fluidotherapy          [x]  Paraffin                   [x] AAROM  [x] AROM                 [] Iontophoresis:   [] Tendon Glides

## 2023-08-16 ENCOUNTER — TREATMENT (OUTPATIENT)
Dept: OCCUPATIONAL THERAPY | Age: 48
End: 2023-08-16

## 2023-08-16 DIAGNOSIS — S51.852A DOG BITE OF FOREARM, LEFT, INITIAL ENCOUNTER: Primary | ICD-10-CM
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
## 2023-08-16 NOTE — PROGRESS NOTES
[] Neuromuscular Re-Ed            [] ADL/IADL re-training    [x] Therapeutic Activity                  [x] Pain Management with/without modalities PRN                 [x] Manual Therapy                      [] Splinting                                   [] Scar Management                   []Joint Protection/Training  []Ergonomics                             [] Joint Mobilization                      [] Adaptive Equipment Assessment/Training                             [] Manual Edema Mobilization   [x] Myofascial Release                 [] Energy Conservation/Work Simplification  [] GM/FM Coordination                [] Safety retraining/education per  individual diagnosis/goals  [x] Desensitization        Patient Specific Goal: \" I want the tingling to go away\". GOALS (Long term same as Short term):  1) Patient will demonstrate good understanding of home program (exercises/activities/diagnosis/prognosis/goals) with good accuracy. 2) Patient will demonstrate increased strength of their left wrist/ forearm from 4/5 to 5.5  for ADL/IADL ease of completion. 3) Patient will demonstrate increased /pinch strength of at least 10 / 2 pinch pounds of their left hand. 4) Patient to report decreased pain in their affected left distal upper extremity from 3-6/10 to 2/10 or less with resistive functional use for ^ ease of IADLs and to improve quality of sleep. 5) Increase in fine motor function evidenced by ability to write and type 20 words without significant impairments. 6) Patient to report a decrease in hypersensitivity from 100% to 20% or less in their distal L arm. 7) Patient will decrease QuickDASH score to 25% or less for increased participation in daily functional activities.          TODAY'S TREATMENT     Pain Level: 3 on scale of 1-10, aching, needle-like, and throbbing    Subjective:  \"I finally got the gabapentin on Monday\"      Objective:    Updated POC

## 2023-08-21 ENCOUNTER — EVALUATION (OUTPATIENT)
Dept: PHYSICAL THERAPY | Age: 48
End: 2023-08-21

## 2023-08-21 ENCOUNTER — TREATMENT (OUTPATIENT)
Dept: OCCUPATIONAL THERAPY | Age: 48
End: 2023-08-21

## 2023-08-21 DIAGNOSIS — S81.852A OPEN BITE OF LEFT LOWER LEG, INITIAL ENCOUNTER: Primary | ICD-10-CM
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
## 2023-08-21 DIAGNOSIS — S51.852A DOG BITE OF FOREARM, LEFT, INITIAL ENCOUNTER: Primary | ICD-10-CM

## 2023-08-21 NOTE — PROGRESS NOTES
[facility] OUTPATIENT REHABILITATION  PHYSICAL THERAPY INITIAL EVALUATION         Date:  2023   Patient: Aj Leon  : 1975  MRN: [id number]  Referring Provider: Esther Bal, [address] [place] [address]     Medical Diagnosis:      Diagnosis Orders   1. Open bite of left lower leg, initial encounter            Physician Order: [place] and Treat   Claim # [id number]  Dx: I32.487G  DOI: 2023   approved 16 visits through 2023     SUBJECTIVE:     Onset date: 23    Onset: Sudden     History / Mechanism of Injury: Patient was attacked by a dog on 23. Areas involved include left lower leg, right breast, left forearm, right posterior thigh. She was attacked upon entering a client's home. She is a home health nurse. Initial treatment was provided in the ED. Chief complaint: Aching, burning pain L leg; altered gait; limited weightbearing duration (1 hour); reduced gait speed    Pain:   Current: 4/10     Best: 1/10     Worst:6/10   Pain frequency: constant, waxing and waning    Symptom Type / Quality: aching, burning , throbbing  Location: L lower leg and calf     Aggravated by: water, contact pressure, walking    Relieved by: padded ice    Imaging results: No results found. Past Medical History  No past medical history on file. Past Surgical History:   Procedure Laterality Date     SECTION      CHOLECYSTECTOMY      HERNIA REPAIR         Medications:   Current Outpatient Medications   Medication Sig Dispense Refill    IUD'S IU by IntraUTERine route      citalopram (CELEXA) 10 MG tablet Take 10 mg by mouth daily (Patient not taking: Reported on 5/15/2022)       No current facility-administered medications for this visit. Occupation:  home health nurse.  .     Patient Goals: pain relief, return to prior activity    Precautions/Contraindications: none    OBJECTIVE:     There is no height or weight on file to calculate

## 2023-08-21 NOTE — PROGRESS NOTES
Patient pulled out 3 IVS MD aware. Patient wanted fluids stopped. Patient very agaitated continued to be monitored. [] Neuromuscular Re-Ed            [] ADL/IADL re-training    [x] Therapeutic Activity                  [x] Pain Management with/without modalities PRN                 [x] Manual Therapy                      [] Splinting                                   [] Scar Management                   []Joint Protection/Training  []Ergonomics                             [] Joint Mobilization                      [] Adaptive Equipment Assessment/Training                             [] Manual Edema Mobilization   [x] Myofascial Release                 [] Energy Conservation/Work Simplification  [] GM/FM Coordination                [] Safety retraining/education per  individual diagnosis/goals  [x] Desensitization        Patient Specific Goal: \" I want the tingling to go away\". GOALS (Long term same as Short term):  1) Patient will demonstrate good understanding of home program (exercises/activities/diagnosis/prognosis/goals) with good accuracy. 2) Patient will demonstrate increased strength of their left wrist/ forearm from 4/5 to 5.5  for ADL/IADL ease of completion. 3) Patient will demonstrate increased /pinch strength of at least 10 / 2 pinch pounds of their left hand. 4) Patient to report decreased pain in their affected left distal upper extremity from 3-6/10 to 2/10 or less with resistive functional use for ^ ease of IADLs and to improve quality of sleep. 5) Increase in fine motor function evidenced by ability to write and type 20 words without significant impairments. 6) Patient to report a decrease in hypersensitivity from 100% to 20% or less in their distal L arm. 7) Patient will decrease QuickDASH score to 25% or less for increased participation in daily functional activities.          TODAY'S TREATMENT     Pain Level: 3 on scale of 1-10, aching, needle-like, and throbbing    Subjective:  \"I had to fill out all these papers the other day and my arm was really

## 2023-08-21 NOTE — PROGRESS NOTES
Physical Therapy Daily Treatment Note    Date: 2023  Patient Name: Gabino Zhang  : 1975   MRN: [id number]  DOInjury: 23   DOSx: --  Referring Provider: Karen Mina, [address] [place] [address],  [address] [place] [address]     Medical Diagnosis:      Diagnosis Orders   1. Open bite of left lower leg, initial encounter            Apple Heath has hypersensitivity of the L LE along with limb weakness. Today, we started on a desensitization home program that is to be done minimum of 4 times a day; progressing to 8 times per day. We will add exercise into her program at follow-up visits to aid LE function and to serve as desensitizers as well. HEP: Provided desensitization program from  linked here: [email or web address]      X = TO BE PERFORMED NEXT VISIT  > = PROGRESS TO THIS    S: See luke  O:   Time 5097-9929     Visit     Claim # [id number]  Dx: B22.060S  DOI: 2023   approved 16 visits through 2023 Repeat outcome measure at mid point and end. Pain Pain 1-6/10     ROM      Modalities         MO   Manual            Exercise      Desensitization program Provided. Link above. Discussed in detail. Continue to review and emphasize. Ankle Alphabet X? Suhas Hailey / can rolling X? NR   Toe curls X? NR   BAPS  progress from seated > standing with involved foot on board other foot on floor > standing 2-footed > standing 1-footed X     Air-Ex Balance / Weight shifting X           Leg Press 2-leg   TE   Leg Press 1-leg   TE   Hamstring Curl Machine   TE   Knee Extension Machine   TE   Step-ups - FWD   TA   Step-ups - LAT   TA   Step-ups - BWD   TA   Calf Raises   TA   Single leg dead lift   TA   Bruneian split squat   TA               A:  Tolerated well. Discussed anatomy, physiology, body mechanics, principles of loading, and progressive loading/activity.     P: Continue with rehab plan  Parviz Mujica PT    Treatment Charges:

## 2023-08-23 ENCOUNTER — TREATMENT (OUTPATIENT)
Dept: OCCUPATIONAL THERAPY | Age: 48
End: 2023-08-23

## 2023-08-23 ENCOUNTER — TREATMENT (OUTPATIENT)
Dept: PHYSICAL THERAPY | Age: 48
End: 2023-08-23

## 2023-08-23 DIAGNOSIS — S51.852A DOG BITE OF FOREARM, LEFT, INITIAL ENCOUNTER: Primary | ICD-10-CM
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
## 2023-08-23 DIAGNOSIS — S81.852A OPEN BITE OF LEFT LOWER LEG, INITIAL ENCOUNTER: Primary | ICD-10-CM

## 2023-08-23 NOTE — PROGRESS NOTES
OCCUPATIONAL THERAPY DAILY NOTE  [facility] THERAPY   [place] [address] [address]  Dept: [phone]  Loc: [phone]   Gladys Flowers OT Fax: [phone]      Date:  2023  Initial Evaluation Date: 23  Evaluating Therapist: Linda Hidalgo OT/L     Patient Name:  Patsy Huff    :  1975    Restrictions/Precautions:   Activity as tolerated, Low fall risk  Diagnosis:  S51.852A- Dog bite of forearm, left                                                         Date of Surgery/Injury:      Insurance/Certification information:  [address] [facility] #[id number]/ [phone]  Plan of care signed (Y/N): N  Visit# / total visits:  approved visits (thru 23)     Referring Practitioner:  Margot Lyon PA-C  Specific Practitioner Orders: OT evaluate and treat     Assessment of current deficits   [] Functional mobility             [] ADLs           [x] Strength                  [] Cognition   [] Functional transfers           [x] IADLs          [] Safety Awareness  [] Endurance   [x] Fine Motor Coordination    [] Balance      [] Vision/perception    [x] Sensation     [] Gross Motor Coordination [] ROM           [x] Pain                        [] Edema          [] Scar Adhesion/Skin Integrity      OT PLAN OF CARE   OT POC based on physician orders, patient diagnosis and results of clinical assessment     Frequency/Duration: 2x/ week x 6 weeks (Frequency decreased from the recommended 3x/ week due to minimizing further trauma to the nerve injury)     Specific OT Treatment to include:   [x] Instruction in HEP                   Modalities:  [x] Therapeutic Exercise                 [x] Ultrasound               [] Electrical Stimulation/Attended  [x] PROM/Stretching                    [x] Fluidotherapy          [x]  Paraffin                   [x] AAROM  [x] AROM                 [] Iontophoresis:   [] Tendon Glides

## 2023-08-23 NOTE — PROGRESS NOTES
Physical Therapy Daily Treatment Note    Date: 2023  Patient Name: Willy Guadarrama  : 1975   MRN: [id number]  DOInjury: 23   DOSx: --  Referring Provider: Armando Oseguera, [address] [place] [address],  [address] [place] [address] Diagnosis:      Diagnosis Orders   1. Open bite of left lower leg, initial encounter            Kena Martinez has hypersensitivity of the L LE along with limb weakness. Today, we started on a desensitization home program that is to be done minimum of 4 times a day; progressing to 8 times per day. We will add exercise into her program at follow-up visits to aid LE function and to serve as desensitizers as well. HEP: Provided desensitization program from  linked here: [email or web address]    Access Code: [email or web address]  URL: [email or web address]  Date: 2023  Prepared by: Magy Odell    Exercises  - Seated Ankle Alphabet  - 2 x daily - 1 sets  - Towel Scrunches  - 2 x daily - 2 sets - 20 reps  - Ball Rolling  - 2 x daily - 2 sets - 20 reps      X = TO BE PERFORMED NEXT VISIT  > = PROGRESS TO THIS    S: Sore. Burning. O:   Time 0629-1647     Visit     Claim # [id number]  Dx: F37.861K  DOI: 2023   approved 16 visits through 2023 Repeat outcome measure at mid point and end. Pain Pain 1-6/10     ROM      Modalities         MO   Manual            Exercise      Desensitization program Provided. Link above. Discussed in detail. Continue to review and emphasize.        Ankle Alphabet X 1 set  NR   Ball / can rolling 2 x 20  NR   Toe curls 2 x 20  NR   BAPS  progress from seated > standing with involved foot on board other foot on floor > standing 2-footed > standing 1-footed Seated x 20 all directions     Air-Ex Balance / Weight shifting X 20 fwd/bwd, side-to-side           Leg Press 2-leg X  TE   Leg Press 1-leg >  TE   Hamstring Curl Machine >?  TE   Knee Extension Machine >?  TE   Step-ups -

## 2023-08-28 ENCOUNTER — TREATMENT (OUTPATIENT)
Dept: PHYSICAL THERAPY | Age: 48
End: 2023-08-28

## 2023-08-28 ENCOUNTER — TREATMENT (OUTPATIENT)
Dept: OCCUPATIONAL THERAPY | Age: 48
End: 2023-08-28

## 2023-08-28 DIAGNOSIS — S51.852A DOG BITE OF FOREARM, LEFT, INITIAL ENCOUNTER: Primary | ICD-10-CM

## 2023-08-28 DIAGNOSIS — S81.852A OPEN BITE OF LEFT LOWER LEG, INITIAL ENCOUNTER: Primary | ICD-10-CM
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
## 2023-08-28 NOTE — PROGRESS NOTES
OCCUPATIONAL THERAPY DAILY NOTE  [facility] THERAPY   Muriel Dela [place] [address] [address]  Dept: [phone]  Loc: [phone]   Camila Marker OT Fax: [phone]      Date:  2023  Initial Evaluation Date: 23  Evaluating Therapist: Elana Shetty OT/L     Patient Name:  Eliseo Velasquez    :  1975    Restrictions/Precautions:   Activity as tolerated, Low fall risk  Diagnosis:  S51.852A- Dog bite of forearm, left                                                         Date of Surgery/Injury: 3-60-50     Insurance/Certification information:  [address]  [facility] -  #[id number]/ [phone]  Plan of care signed (Y/N): N  Visit# / total visits:  approved visits (thru 23)     Referring Practitioner:  Danny Hernandez PA-C  Specific Practitioner Orders: OT evaluate and treat     Assessment of current deficits   [] Functional mobility             [] ADLs           [x] Strength                  [] Cognition   [] Functional transfers           [x] IADLs          [] Safety Awareness  [] Endurance   [x] Fine Motor Coordination    [] Balance      [] Vision/perception    [x] Sensation     [] Gross Motor Coordination [] ROM           [x] Pain                        [] Edema          [] Scar Adhesion/Skin Integrity      OT PLAN OF CARE   OT POC based on physician orders, patient diagnosis and results of clinical assessment     Frequency/Duration: 2x/ week x 6 weeks (Frequency decreased from the recommended 3x/ week due to minimizing further trauma to the nerve injury)     Specific OT Treatment to include:   [x] Instruction in HEP                   Modalities:  [x] Therapeutic Exercise                 [x] Ultrasound               [] Electrical Stimulation/Attended  [x] PROM/Stretching                    [x] Fluidotherapy          [x]  Paraffin                   [x] AAROM  [x] AROM                 [] Iontophoresis:   [] Tendon Glides

## 2023-08-28 NOTE — PROGRESS NOTES
TE   Hamstring Curl Machine >?  TE   Knee Extension Machine >?  TE   Step-ups - FWD X? TA   Step-ups - LAT >  TA   Step-ups - BWD >  TA   Calf Raises >  TA   Single leg dead lift >  TA   Kinyarwanda split squat >  TA               A:  Tolerated well. Written HEP updated.    P: Continue with rehab plan  Diana Santiago PTA    Treatment Charges: Mins Units   Initial Evaluation     Re-Evaluation     Ther Exercise         TE 30 2   Manual Therapy     MT     Ther Activities        TA     Gait Training          GT     Neuro Re-education NR     Modalities     Non-Billable Service Time     Other     Total Time/Units 30 2

## 2023-08-30 ENCOUNTER — TREATMENT (OUTPATIENT)
Dept: OCCUPATIONAL THERAPY | Age: 48
End: 2023-08-30

## 2023-08-30 ENCOUNTER — TREATMENT (OUTPATIENT)
Dept: PHYSICAL THERAPY | Age: 48
End: 2023-08-30

## 2023-08-30 DIAGNOSIS — S51.852A DOG BITE OF FOREARM, LEFT, INITIAL ENCOUNTER: Primary | ICD-10-CM
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
## 2023-08-30 DIAGNOSIS — S81.852A OPEN BITE OF LEFT LOWER LEG, INITIAL ENCOUNTER: Primary | ICD-10-CM

## 2023-08-30 NOTE — PROGRESS NOTES
OCCUPATIONAL THERAPY DAILY NOTE  [address] [facility] THERAPY   Erick Moffett [address] [address]  Dept: [phone]  Loc: [phone]   Velma Elam OT Fax: [phone]      Date:  2023  Initial Evaluation Date: 23  Evaluating Therapist: Henry Rowell OT/L     Patient Name:  Jovita Arambula    :  1975    Restrictions/Precautions:   Activity as tolerated, Low fall risk  Diagnosis:  S51.852A- Dog bite of forearm, left                                                         Date of Surgery/Injury: 99     Insurance/Certification information:  [address] [facility] #[id number]  Plan of care signed (Y/N): N  Visit# / total visits:  approved visits (thru 23)     Referring Practitioner:  Charleen Raza PA-C  Specific Practitioner Orders: OT evaluate and treat     Assessment of current deficits   [] Functional mobility             [] ADLs           [x] Strength                  [] Cognition   [] Functional transfers           [x] IADLs          [] Safety Awareness  [] Endurance   [x] Fine Motor Coordination    [] Balance      [] Vision/perception    [x] Sensation     [] Gross Motor Coordination [] ROM           [x] Pain                        [] Edema          [] Scar Adhesion/Skin Integrity      OT PLAN OF CARE   OT POC based on physician orders, patient diagnosis and results of clinical assessment     Frequency/Duration: 2x/ week x 6 weeks (Frequency decreased from the recommended 3x/ week due to minimizing further trauma to the nerve injury)     Specific OT Treatment to include:   [x] Instruction in HEP                   Modalities:  [x] Therapeutic Exercise                 [x] Ultrasound               [] Electrical Stimulation/Attended  [x] PROM/Stretching                    [x] Fluidotherapy          [x]  Paraffin                   [x] AAROM  [x] AROM                 [] Iontophoresis:   [] Tendon Glides

## 2023-08-30 NOTE — PROGRESS NOTES
Physical Therapy Daily Treatment Note    Date: 2023  Patient Name: Vish Jett  : 1975   MRN: [id number]  DOInjury: 23   DOSx: --    Referring Provider:   Ira Thomas, [address] [place] [address],  [address] [place] [address]         Medical Diagnosis:    Diagnosis Orders   1. Open bite of left lower leg, initial encounter            Taylor Hawkins has hypersensitivity of the L LE along with limb weakness. Today, we started on a desensitization home program that is to be done minimum of 4 times a day; progressing to 8 times per day. We will add exercise into her program at follow-up visits to aid LE function and to serve as desensitizers as well. HEP: Provided desensitization program from  linked here: [email or web address]    Access Code: [email or web address]  URL: [email or web address]  Date: 2023  Prepared by: Eamon Mazariegos    Exercises  - Seated Ankle Alphabet  - 2 x daily - 1 sets  - Towel Scrunches  - 2 x daily - 2 sets - 20 reps  - Ball Rolling  - 2 x daily - 2 sets - 20 reps      X = TO BE PERFORMED NEXT VISIT  > = PROGRESS TO THIS    S: Patient reports a lot of ache in calf and medial side of leg. O:   Time 6091-2778     Visit     Claim # [id number]  Dx: U38.143K  DOI: 2023   approved 16 visits through 2023 Repeat outcome measure at mid point and end. Pain Pain 4/10     ROM      Modalities         MO   Manual            Exercise      Desensitization program Provided. Link above. Discussed in detail. Continue to review and emphasize.        Ankle Alphabet X 1 set  NR   Ball / can rolling 2 x 20  NR   Toe curls 2 x 20 HEP NR   BAPS  progress from seated > standing with involved foot on board other foot on floor > standing 2-footed > standing 1-footed L2 2 x 20 reps   Standing in all directions     Air-Ex Balance / Weight shifting X 20 fwd/bwd, side-to-side     Air-Ex March X 20 reps     Leg Press 2-leg 20# 3 x 10  TE   Leg

## 2023-09-06 ENCOUNTER — TREATMENT (OUTPATIENT)
Dept: OCCUPATIONAL THERAPY | Age: 48
End: 2023-09-06

## 2023-09-06 ENCOUNTER — TREATMENT (OUTPATIENT)
Dept: PHYSICAL THERAPY | Age: 48
End: 2023-09-06

## 2023-09-06 DIAGNOSIS — S81.852A OPEN BITE OF LEFT LOWER LEG, INITIAL ENCOUNTER: Primary | ICD-10-CM

## 2023-09-06 DIAGNOSIS — S51.852A DOG BITE OF FOREARM, LEFT, INITIAL ENCOUNTER: Primary | ICD-10-CM
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
## 2023-09-06 NOTE — PROGRESS NOTES
Physical Therapy Daily Treatment Note    Date: 2023  Patient Name: Ila Spence  : 1975   MRN: [id number]  DOInjury: 23   DOSx: --    Referring Provider:   Joao Hebert, [address] [place] [address],  [address] [place] [address]         Medical Diagnosis:    Diagnosis Orders   1. Open bite of left lower leg, initial encounter            Janelle Cabral has hypersensitivity of the L LE along with limb weakness. Today, we started on a desensitization home program that is to be done minimum of 4 times a day; progressing to 8 times per day. We will add exercise into her program at follow-up visits to aid LE function and to serve as desensitizers as well. HEP: Provided desensitization program from  linked here: [email or web address]    Access Code: [email or web address]  URL: [email or web address]  Date: 2023  Prepared by: Hsieh House    Exercises  - Seated Ankle Alphabet  - 2 x daily - 1 sets  - Towel Scrunches  - 2 x daily - 2 sets - 20 reps  - Ball Rolling  - 2 x daily - 2 sets - 20 reps      X = TO BE PERFORMED NEXT VISIT  > = PROGRESS TO THIS    S: Patient reports ache in leg today that will come and go. Still having most difficulty with toe curls. O:   Time 3407-3661     Visit     Claim # [id number]  Dx: O83.822Y  DOI: 2023   approved 16 visits through 2023 Repeat outcome measure at mid point and end. Pain Pain 4/10     ROM      Modalities         MO   Manual            Exercise      Desensitization program Provided. Link above. Discussed in detail. Continue to review and emphasize.        Recumbent Bike L5 x 5 min  TE   Ankle Alphabet  NR   Ball / can rolling  NR   Toe curls 2 x 20 HEP NR   BAPS  progress from seated > standing with involved foot on board other foot on floor > standing 2-footed > standing 1-footed L3 2 x 20 reps   Standing in all directions     Air-Ex Balance / Weight shifting     Air-Ex Calf raises X 20 reps

## 2023-09-06 NOTE — PROGRESS NOTES
[] Neuromuscular Re-Ed            [] ADL/IADL re-training    [x] Therapeutic Activity                  [x] Pain Management with/without modalities PRN                 [x] Manual Therapy                      [] Splinting                                   [] Scar Management                   []Joint Protection/Training  []Ergonomics                             [] Joint Mobilization                      [] Adaptive Equipment Assessment/Training                             [] Manual Edema Mobilization   [x] Myofascial Release                 [] Energy Conservation/Work Simplification  [] GM/FM Coordination                [] Safety retraining/education per  individual diagnosis/goals  [x] Desensitization        Patient Specific Goal: \" I want the tingling to go away\". GOALS (Long term same as Short term):  1) Patient will demonstrate good understanding of home program (exercises/activities/diagnosis/prognosis/goals) with good accuracy. 2) Patient will demonstrate increased strength of their left wrist/ forearm from 4/5 to 5.5  for ADL/IADL ease of completion. 3) Patient will demonstrate increased /pinch strength of at least 10 / 2 pinch pounds of their left hand. 4) Patient to report decreased pain in their affected left distal upper extremity from 3-6/10 to 2/10 or less with resistive functional use for ^ ease of IADLs and to improve quality of sleep. 5) Increase in fine motor function evidenced by ability to write and type 20 words without significant impairments. 6) Patient to report a decrease in hypersensitivity from 100% to 20% or less in their distal L arm. 7) Patient will decrease QuickDASH score to 25% or less for increased participation in daily functional activities. TODAY'S TREATMENT     Pain Level:   2 on scale of 1-10, aching, needle-like, and throbbing    Subjective:  Pt. Stated \"My arm is a lot better today. \"      Objective:    Updated

## 2023-09-08 ENCOUNTER — TREATMENT (OUTPATIENT)
Dept: OCCUPATIONAL THERAPY | Age: 48
End: 2023-09-08

## 2023-09-08 ENCOUNTER — TREATMENT (OUTPATIENT)
Dept: PHYSICAL THERAPY | Age: 48
End: 2023-09-08
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
## 2023-09-08 DIAGNOSIS — S51.852A DOG BITE OF FOREARM, LEFT, INITIAL ENCOUNTER: Primary | ICD-10-CM

## 2023-09-08 DIAGNOSIS — S81.852A OPEN BITE OF LEFT LOWER LEG, INITIAL ENCOUNTER: Primary | ICD-10-CM

## 2023-09-08 NOTE — PROGRESS NOTES
OCCUPATIONAL THERAPY PROGRESS NOTE/ STATUS UPDATE  [address]  [facility] THERAPY   Anita Valdez [place] [address]  Dept: [phone]  Loc: [phone]   Doreen Camacho OT Fax: [phone]      Date:  2023  Initial Evaluation Date: 23  Evaluating Therapist: Corby Bland OT/L     Patient Name:  Boubacar Avery    :  1975    Restrictions/Precautions:   Activity as tolerated, Low fall risk  Diagnosis:  S51.852A- Dog bite of forearm, left                                                         Date of Surgery/Injury: 3-63-64     Insurance/Certification information:  [facility]-  #[id number]/ [id number]  Plan of care signed (Y/N): N  Visit# / total visits: 10 / 12 approved visits (thru 23)     Referring Practitioner:  Hector Carter PA-C  Specific Practitioner Orders: OT evaluate and treat     Assessment of current deficits   [] Functional mobility             [] ADLs           [x] Strength                  [] Cognition   [] Functional transfers           [x] IADLs          [] Safety Awareness  [] Endurance   [x] Fine Motor Coordination    [] Balance      [] Vision/perception    [x] Sensation     [] Gross Motor Coordination [] ROM           [x] Pain                        [] Edema          [] Scar Adhesion/Skin Integrity      OT PLAN OF CARE   OT POC based on physician orders, patient diagnosis and results of clinical assessment     Frequency/Duration: 2x/ week x 6 weeks (Frequency decreased from the recommended 3x/ week due to minimizing further trauma to the nerve injury)     Specific OT Treatment to include:   [x] Instruction in HEP                   Modalities:  [x] Therapeutic Exercise                 [x] Ultrasound               [] Electrical Stimulation/Attended  [x] PROM/Stretching                    [x] Fluidotherapy          [x]  Paraffin                   [x] AAROM  [x] AROM                 [] Iontophoresis:   [] Tendon Glides

## 2023-09-08 NOTE — PROGRESS NOTES
Physical Therapy Daily Treatment Note    Date: 2023  Patient Name: Monica Shafer  : 1975   MRN: [id number]  DOInjury: 23   DOSx: --    Referring Provider:   Sharan Simmons, [address] [place] [address],  [address] [place] [address]         Medical Diagnosis:    Diagnosis Orders   1. Open bite of left lower leg, initial encounter            Pari has hypersensitivity of the L LE along with limb weakness. Today, we started on a desensitization home program that is to be done minimum of 4 times a day; progressing to 8 times per day. We will add exercise into her program at follow-up visits to aid LE function and to serve as desensitizers as well. HEP: Provided desensitization program from  linked here: [email or web address]    Access Code: [email or web address]  URL: [email or web address]  Date: 2023  Prepared by: Shital Chairez    Exercises  - Seated Ankle Alphabet  - 2 x daily - 1 sets  - Towel Scrunches  - 2 x daily - 2 sets - 20 reps  - Ball Rolling  - 2 x daily - 2 sets - 20 reps      X = TO BE PERFORMED NEXT VISIT  > = PROGRESS TO THIS    S: Patient reports she is hurting more today from trying to do housework yesterday. Feels more pressure all around foot. O:   Time 2745-6208     Visit     Claim # [id number]  Dx: M38.383D  DOI: 2023   approved 16 visits through 2023 Repeat outcome measure at mid point and end. Pain Pain 4/10     ROM      Modalities         MO   Manual            Exercise      Desensitization program Provided. Link above. Discussed in detail. Continue to review and emphasize.        Recumbent Bike L5 x 6 min  TE   Ankle Alphabet  NR   Ball / can rolling  NR   Toe curls 2 x 20 HEP NR   BAPS  progress from seated > standing with involved foot on board other foot on floor > standing 2-footed > standing 1-footed L3 2 x 20 reps   Standing in all directions     Air-Ex Balance / Weight shifting     Air-Ex Calf raises

## 2023-09-13 ENCOUNTER — TREATMENT (OUTPATIENT)
Dept: OCCUPATIONAL THERAPY | Age: 48
End: 2023-09-13

## 2023-09-13 ENCOUNTER — TREATMENT (OUTPATIENT)
Dept: PHYSICAL THERAPY | Age: 48
End: 2023-09-13

## 2023-09-13 DIAGNOSIS — S81.852A OPEN BITE OF LEFT LOWER LEG, INITIAL ENCOUNTER: Primary | ICD-10-CM
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
## 2023-09-13 DIAGNOSIS — S51.852A DOG BITE OF FOREARM, LEFT, INITIAL ENCOUNTER: Primary | ICD-10-CM

## 2023-09-13 NOTE — PROGRESS NOTES
OCCUPATIONAL THERAPY DAILY NOTE  [facility] THERAPY   Devorah Goodell [address] [address]  Dept: [phone]  Loc: [phone]   Fran Ramirez OT Fax: [phone]      Date:  2023  Initial Evaluation Date: 23  Evaluating Therapist: Joel Osborne OT/L     Patient Name:  Atif Phan    :  1975    Restrictions/Precautions:   Activity as tolerated, Low fall risk  Diagnosis:  S51.852A- Dog bite of forearm, left                                                         Date of Surgery/Injury:      Insurance/Certification information:  [address]  [facility]-  #[phone]/ [id number]  Plan of care signed (Y/N): N  Visit# / total visits:  approved visits (thru 23)     Referring Practitioner:  Shena Tovar PA-C  Specific Practitioner Orders: OT evaluate and treat     Assessment of current deficits   [] Functional mobility             [] ADLs           [x] Strength                  [] Cognition   [] Functional transfers           [x] IADLs          [] Safety Awareness  [] Endurance   [x] Fine Motor Coordination    [] Balance      [] Vision/perception    [x] Sensation     [] Gross Motor Coordination [] ROM           [x] Pain                        [] Edema          [] Scar Adhesion/Skin Integrity      OT PLAN OF CARE   OT POC based on physician orders, patient diagnosis and results of clinical assessment     Frequency/Duration: 2x/ week x 6 weeks (Frequency decreased from the recommended 3x/ week due to minimizing further trauma to the nerve injury)     Specific OT Treatment to include:   [x] Instruction in HEP                   Modalities:  [x] Therapeutic Exercise                 [x] Ultrasound               [] Electrical Stimulation/Attended  [x] PROM/Stretching                    [x] Fluidotherapy          [x]  Paraffin                   [x] AAROM  [x] AROM                 [] Iontophoresis:   [] Tendon Glides

## 2023-09-13 NOTE — PROGRESS NOTES
Physical Therapy Daily Treatment Note    Date: 2023  Patient Name: Tasia Sender  : 1975   MRN: [id number]  DOInjury: 23   DOSx: --    Referring Provider:   Julianna Oro, [address] [place] [address],  [address] [place] [address]         Medical Diagnosis:    Diagnosis Orders   1. Open bite of left lower leg, initial encounter              Ivana Perla has hypersensitivity of the L LE along with limb weakness. Today, we started on a desensitization home program that is to be done minimum of 4 times a day; progressing to 8 times per day. We will add exercise into her program at follow-up visits to aid LE function and to serve as desensitizers as well. HEP: Provided desensitization program from  linked here: [email or web address]    Access Code: [email or web address]  URL: [email or web address]  Date: 2023  Prepared by: Malou Ross    Exercises  - Seated Ankle Alphabet  - 2 x daily - 1 sets  - Towel Scrunches  - 2 x daily - 2 sets - 20 reps  - Ball Rolling  - 2 x daily - 2 sets - 20 reps      X = TO BE PERFORMED NEXT VISIT  > = PROGRESS TO THIS    S: Patient reports 2/10 pain. States she went to gym and rode bike and leg press. Had some soreness the next day but felt good. O:   Time 3739-8264     Visit     Claim # [id number]  Dx: O76.320M  DOI: 2023   approved 16 visits through 2023 Repeat outcome measure at mid point and end. Pain Pain 4/10     ROM      Modalities         MO   Manual            Exercise      Desensitization program Provided. Link above. Discussed in detail. Continue to review and emphasize.        Recumbent Bike L5 x 6 min  TE   Ankle Alphabet  NR   Ball / can rolling  NR   Toe curls HEP NR   BAPS  progress from seated > standing with involved foot on board other foot on floor > standing 2-footed > standing 1-footed L3 2 x 20 reps   Standing in all directions     Air-Ex Balance / Weight shifting     Air-Ex Calf

## 2023-09-15 ENCOUNTER — TREATMENT (OUTPATIENT)
Dept: PHYSICAL THERAPY | Age: 48
End: 2023-09-15

## 2023-09-15 ENCOUNTER — TREATMENT (OUTPATIENT)
Dept: OCCUPATIONAL THERAPY | Age: 48
End: 2023-09-15

## 2023-09-15 DIAGNOSIS — S81.852A OPEN BITE OF LEFT LOWER LEG, INITIAL ENCOUNTER: Primary | ICD-10-CM
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
## 2023-09-15 DIAGNOSIS — S51.852A DOG BITE OF FOREARM, LEFT, INITIAL ENCOUNTER: Primary | ICD-10-CM

## 2023-09-15 NOTE — PROGRESS NOTES
[facility] ED  EMERGENCY DEPARTMENT ENCOUNTER      Pt Name: Burnard Schwab  MRN: [id number]  [place] 1938  Date of evaluation: 7/24/2021  Provider: Bo Breen DO        HISTORY OF PRESENT ILLNESS    Burnard Schwab is a 81 y.o. female per chart review has ah/o obestiy, hypothyroidism, GERD, tobacco abuse. She presents with her son for back and abdominal pain. She states in the last 24 hours she has had no appetite and loss of taste and smell. She has not had the covid-19 vaccine yet. She denies any fever or chills. She lives with her . The history is provided by the patient. Abdominal Pain  Pain location:  Generalized  Pain quality: aching    Pain radiates to:  Does not radiate  Pain severity:  Mild  Onset quality:  Gradual  Duration:  2 days  Timing:  Constant  Progression:  Unchanged  Chronicity:  New  Context: eating and recent illness    Relieved by:  Nothing  Worsened by:  Eating  Ineffective treatments:  Antacids, not moving and position changes  Associated symptoms: anorexia and nausea    Associated symptoms: no chest pain, no chills, no cough, no dysuria, no fever, no shortness of breath, no sore throat and no vomiting    Risk factors: being elderly and obesity             REVIEW OF SYSTEMS       Review of Systems   Constitutional: Negative for chills and fever. HENT: Negative for ear pain and sore throat. Eyes: Negative for discharge and redness. Respiratory: Negative for cough and shortness of breath. Cardiovascular: Negative for chest pain and palpitations. Gastrointestinal: Positive for abdominal pain, anorexia and nausea. Negative for vomiting. Genitourinary: Negative for difficulty urinating and dysuria. Musculoskeletal: Negative for back pain and neck pain. Skin: Negative for rash and wound. Neurological: Negative for dizziness and syncope. Psychiatric/Behavioral: Negative for confusion. The patient is not nervous/anxious.     All other systems reps     Air-Ex March 2 X 20 reps No hand hold    Leg Press 2-leg 40# 3 x 15  TE   Leg Press 1-leg Add back in when pain improves TE   Hamstring Curl Machine >?  TE   Knee Extension Machine >?  TE   Step-ups - FWD 7\" x 20 reps  TA   Step-ups - LAT 7\" x 20 reps  TA   Step-ups - BWD >  TA   Lunges Add back in     Single leg dead lift \" TA   Tanzanian split squat >  TA               A: Tolerated well. Cut back on exercises due to increased discomfort today.   P: Continue with rehab plan  Rex Eddy PTA    Treatment Charges: Mins Units   Initial Evaluation     Re-Evaluation     Ther Exercise         TE 10 1   Manual Therapy     MT     Ther Activities        TA 20 1   Gait Training          GT     Neuro Re-education NR     Modalities     Non-Billable Service Time     Other     Total Time/Units 30 2 reviewed and are negative. Except as noted above the remainder of the review of systems was reviewed and negative.        PAST MEDICAL HISTORY     Past Medical History:   Diagnosis Date    ROCHELLE (acute kidney injury) ([facility] [address] 7/25/2021    Gastroesophageal reflux disease 6/10/2002    Hypothyroidism     Obesity, Class III, BMI 40-49.9 (morbid obesity) ([facility] 1/4/2016    Tobacco abuse          SURGICAL HISTORY       Past Surgical History:   Procedure Laterality Date    APPENDECTOMY      CHOLECYSTECTOMY           CURRENT MEDICATIONS       Discharge Medication List as of 7/25/2021  4:37 AM      CONTINUE these medications which have NOT CHANGED    Details   ibuprofen (ADVIL;MOTRIN) 200 MG tablet Take 400 mg by mouth every 6 hours as needed for PainHistorical Med      SYNTHROID 150 MCG tablet Take 1 tablet by mouth Daily, Disp-90 tablet, R-0, DAWNormal      furosemide (LASIX) 20 MG tablet Take 1 tablet by mouth daily, Disp-60 tablet, R-3NO PRINT             ALLERGIES     Benadryl [diphenhydramine hcl], Cortisone, Codeine, and Pcn [penicillins]    FAMILY HISTORY       Family History   Problem Relation Age of Onset    Kidney Disease Mother     High Blood Pressure Mother     Stroke Mother     Heart Disease Father           SOCIAL HISTORY       Social History     Socioeconomic History    Marital status:      Spouse name: None    Number of children: None    Years of education: None    Highest education level: None   Occupational History    None   Tobacco Use    Smoking status: Former Smoker     Packs/day: 5.00     Years: 30.00     Pack years: 150.00     Types: Cigarettes     Quit date: 10/1/2017     Years since quitting: 3.8    Smokeless tobacco: Never Used    Tobacco comment: Has been trying to quit since January   Vaping Use    Vaping Use: Never used   Substance and Sexual Activity    Alcohol use: No     Alcohol/week: 0.0 standard drinks    Drug use: No    Sexual activity: Not Currently   Other Cardiovascular:      Rate and Rhythm: Regular rhythm. Tachycardia present. Pulses: Normal pulses. Heart sounds: Normal heart sounds. Pulmonary:      Effort: Pulmonary effort is normal.      Breath sounds: Normal breath sounds. Abdominal:      General: Abdomen is flat. Bowel sounds are normal.      Palpations: Abdomen is soft. Tenderness: There is generalized abdominal tenderness. There is no guarding or rebound. Musculoskeletal:         General: Normal range of motion. Cervical back: Full passive range of motion without pain, normal range of motion and neck supple. Skin:     General: Skin is warm. Capillary Refill: Capillary refill takes less than 2 seconds. Neurological:      General: No focal deficit present. Mental Status: She is alert and oriented to person, place, and time. Deep Tendon Reflexes: Reflexes are normal and symmetric. Psychiatric:         Attention and Perception: Attention and perception normal.         Mood and Affect: Mood is anxious. Behavior: Behavior normal. Behavior is cooperative.            LABS:  Labs Reviewed   COMPREHENSIVE METABOLIC PANEL - Abnormal; Notable for the following components:       Result Value    CO2 15 (*)     Glucose 118 (*)     BUN 68 (*)     CREATININE 2.50 (*)     GFR Non- 18.4 (*)     GFR  22.3 (*)     Total Protein 8.2 (*)     Globulin 4.3 (*)     All other components within normal limits   CBC WITH AUTO DIFFERENTIAL - Abnormal; Notable for the following components:    WBC 13.9 (*)     Hemoglobin 10.6 (*)     Hematocrit 32.9 (*)     MCV 75.8 (*)     MCH 24.4 (*)     RDW 18.1 (*)     Platelets 839 (*)     Neutrophils % 79.9 (*)     Neutrophils Absolute 11.1 (*)     Monocytes Absolute 1.0 (*)     All other components within normal limits   COVID-19, RAPID   LIPASE   AMYLASE   URINE RT REFLEX TO CULTURE         MDM:   Vitals:    Vitals:    07/24/21 2355 07/25/21 0100 07/25/21 0210 07/25/21 0442   BP: (!) 147/71   (!) 148/69   Pulse: 105   93   Resp: 16   22   Temp: 98.5 °F (36.9 °C)      TempSrc: Skin      SpO2: 95% 98% 100% 99%   Weight: 180 lb (81.6 kg)      Height: 5' 3\" (1.6 m)          MDM  Number of Diagnoses or Management Options  Diagnosis management comments: Patient presents with back pain and abdominal pain. Labs, EKG, CXR and medication ordered. COVID-19 is negative. I ordered toradol 15mg IV and zofran 4mg IV for pain. I rechecked on her and she is still in pain. Her EKG is NSR. CXR**  Her BUN is 68 and her Cr 2.5 on her CBC she has a WBC of 13.9. CT ab/pelvis: no acute changes. Diverticulosis no diverticulitis. I advised her that she needs to be admitted for acute renal failure. She wants to leave A. I explained to her the risks. She states she understands. She signed the [place] form. EKG Interpretation    Interpreted by emergency department physician    Rhythm: normal sinus   Rate: tachycardia  Axis: normal  Ectopy: none  Conduction: normal  ST Segments: nonspecific changes  T Waves: non specific changes  Q Waves: none    Clinical Impression: non-specific EKG    ANGELIA QUINONES DO     The lab results, radiology and test results were reviewed with the patient and family. The patient will follow up in 2 days with their primary care doctor. If their symptoms change or get worse they will return to the ER. CRITICAL CARE TIME   Total CriticalCare time was 0 minutes, excluding separately reportable procedures. There was a high probability of clinically significant/life threatening deterioration in the patient's condition which required my urgent intervention. PROCEDURES:  Unlessotherwise noted below, none     Procedures      FINAL IMPRESSION      1.  Left against medical advice          DISPOSITION/PLAN   DISPOSITION [place] 07/25/2021 04:29:15 AM          ANGELIA Reese DO (electronically signed)  Attending Emergency Physician          Nam Chavez DO  07/28/21 2333

## 2023-09-20 ENCOUNTER — TREATMENT (OUTPATIENT)
Dept: OCCUPATIONAL THERAPY | Age: 48
End: 2023-09-20

## 2023-09-20 ENCOUNTER — TREATMENT (OUTPATIENT)
Dept: PHYSICAL THERAPY | Age: 48
End: 2023-09-20

## 2023-09-20 DIAGNOSIS — S51.852A DOG BITE OF FOREARM, LEFT, INITIAL ENCOUNTER: Primary | ICD-10-CM
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
## 2023-09-20 DIAGNOSIS — S81.852A OPEN BITE OF LEFT LOWER LEG, INITIAL ENCOUNTER: Primary | ICD-10-CM

## 2023-09-20 NOTE — PROGRESS NOTES
Physical Therapy Daily Treatment Note    Date: 2023  Patient Name: Aj Leon  : 1975   MRN: [id number]  DOInjury: 23   DOSx: --    Referring Provider:   Esther Bal, [address] [place] [address],  [address] [place] [address]         Medical Diagnosis:    Diagnosis Orders   1. Open bite of left lower leg, initial encounter              [facility] has hypersensitivity of the L LE along with limb weakness. Today, we started on a desensitization home program that is to be done minimum of 4 times a day; progressing to 8 times per day. We will add exercise into her program at follow-up visits to aid LE function and to serve as desensitizers as well. HEP: Provided desensitization program from  linked here: [email or web address]    Access Code: [email or web address]  URL: [email or web address]  Date: 2023  Prepared by: Micah Lawrence    Exercises  - Seated Ankle Alphabet  - 2 x daily - 1 sets  - Towel Scrunches  - 2 x daily - 2 sets - 20 reps  - Ball Rolling  - 2 x daily - 2 sets - 20 reps      X = TO BE PERFORMED NEXT VISIT  > = PROGRESS TO THIS    S: Patient complains of tightness/ pressure in calf. States she is still sore from chasing puppy but it is improving. O:   Time 0267-2456     Visit     Claim # [id number]  Dx: P32.926N  DOI: 2023   approved 16 visits through 2023 Repeat outcome measure at mid point and end. Pain Pain 4/10     ROM      Modalities         MO   Manual            Exercise      Desensitization program Provided. Link above. Discussed in detail. Continue to review and emphasize.        Recumbent Bike L5 x 6 min  TE   Ankle Alphabet  NR   Ball / can rolling  NR   Toe curls HEP NR   BAPS  progress from seated > standing with involved foot on board other foot on floor > standing 2-footed > standing 1-footed L3 2 x 20 reps   Standing in all directions     Air-Ex Balance / Weight shifting     Air-Ex Calf raises 2 X 20
negative...

## 2023-09-20 NOTE — PROGRESS NOTES
reps   -Wrist extension stretch into power web- 10 reps with 10 sec holds. Scar Mass/Edema Control:     Soft tissue massage x -L forearm with graston tool. Strengthening:     L hand  x        x     -Green 3# digiflex ex's- composite- 20 reps individual-20 reps   -Yellow theraputty ex's- - 10 reps and roll/pinch-3x. (HEP)  -Power hand gripper 2nd setting to pick and pass pom poms  -Red clothespin ax to pinch and pass pom poms   LUE      -Red theraband ex's- high, middle and low rows. - 10 reps each   -Yellow 6# larissa bar- bend up/down and twists- 15 reps. Other: HEP     Ulnar nerve glides x    Compression sleeve  x -Size E tubigrip to help decrease pain. Assessment/Comments: Pt. Tolerated all exercises and stretches today. Pt. Has new C-9 for 2x a week for 6 more weeks through OCT. 31st, To focus on desensitization, pain, [place] and strength of LUE. Will continue to advance as tolerated. -Rehab Potential: Good   -Patient Response to Treatment: Pt. Is happy with her progress , but still frustrated with her nerve pain. Patient. Education:  [x] Plans/Goals, Risks/Benefits discussed  [x] Home exercise program  Method of Education: [x] Verbal  [x] Demo  [] Written  Comprehension of Education:  [x] Verbalizes understanding. [x] Demonstrates understanding. [] Needs Review. [] Demonstrates/verbalizes understanding of HEP/Ed previously given.       Time In: 1100            Time Out: 1200         CODE  Minutes Time in- time out  Units   65421 Fluidotherapy 15 2833-2781 1   77902 Paraffin      64110 Ultrasound      03643 Electrical Stim - Attended      90228 Therapeutic Ex 15 3089-9253 1   28981 Therapeutic Activity 20 0130-6791 1   61367 Manual Therapy 10 0404-2173 1   58917 Orthotic Management/Training       Other                     Total  60  4       Plan: OT 2x/week for 12 sessions    [x]  Continues Plan of care with focus on ROM, Desensitization, nerve pain and strength/endurance of

## 2023-09-22 ENCOUNTER — TREATMENT (OUTPATIENT)
Dept: OCCUPATIONAL THERAPY | Age: 48
End: 2023-09-22

## 2023-09-22 ENCOUNTER — TREATMENT (OUTPATIENT)
Dept: PHYSICAL THERAPY | Age: 48
End: 2023-09-22

## 2023-09-22 DIAGNOSIS — S51.852A DOG BITE OF FOREARM, LEFT, INITIAL ENCOUNTER: Primary | ICD-10-CM
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
## 2023-09-22 DIAGNOSIS — S81.852A OPEN BITE OF LEFT LOWER LEG, INITIAL ENCOUNTER: Primary | ICD-10-CM

## 2023-09-22 NOTE — PROGRESS NOTES
Physical Therapy Daily Treatment Note    Date: 2023  Patient Name: Eliseo Velasquez  : 1975   MRN: [id number]  DOInjury: 23   DOSx: --    Referring Provider:   Cass Corley, [address] [place] [address],  [address] [place] [address] Diagnosis:    Diagnosis Orders   1. Open bite of left lower leg, initial encounter              Abiola Mcduffie has hypersensitivity of the L LE along with limb weakness. Today, we started on a desensitization home program that is to be done minimum of 4 times a day; progressing to 8 times per day. We will add exercise into her program at follow-up visits to aid LE function and to serve as desensitizers as well. HEP: Provided desensitization program from  linked here: [email or web address]    Access Code: [email or web address]  URL: [email or web address]  Date: 2023  Prepared by: Lynsey Meyers    Exercises  - Seated Ankle Alphabet  - 2 x daily - 1 sets  - Towel Scrunches  - 2 x daily - 2 sets - 20 reps  - Ball Rolling  - 2 x daily - 2 sets - 20 reps      X = TO BE PERFORMED NEXT VISIT  > = PROGRESS TO THIS    S: Patient  reports no new changes since last visit . O:   Time 9842-1237 am     Visit 10 /16    Claim # [id number]  Dx: A57.008R  DOI: 2023   approved 16 visits through 2023 Repeat outcome measure at mid point and end. Pain Pain 4/10     ROM      Modalities         MO   Manual            Exercise      Desensitization program Provided. Link above. Discussed in detail. Continue to review and emphasize.        Recumbent Bike L5 x 6 min  TE   Ankle Alphabet  NR   Ball / can rolling  NR   Toe curls HEP NR   BAPS  progress from seated > standing with involved foot on board other foot on floor > standing 2-footed > standing 1-footed L3 2 x 20 reps   Standing in all directions     Air-Ex Balance / Weight shifting     Air-Ex Calf raises 2 X 20 reps     Air-Ex March 2 X 20 reps No hand hold    Leg Press 19-Aug-2018 18:59

## 2023-09-22 NOTE — PROGRESS NOTES
sleeping through the night due to night baig from incident, leg pain from bites and numbness and tingling in the arm. 5) Increase in fine motor function evidenced by ability to write and type 20 words without significant impairments. Progress noted. Pt. Able to write 20 word without rest, but had increased aching and tingling from the 7th word on. 6) Patient to report a decrease in hypersensitivity from 100% to 20% or less in their distal L arm. Progress noted. Pt. Reports hypersensitivity at 50% now. 7) Patient will decrease QuickDASH score to 25% or less for increased participation in daily functional activities. Progress noted, QuickDASH score decreased from 59% to 40% today. TODAY'S TREATMENT     Pain Level:   2 on scale of 1-10, aching, needle-like, and throbbing    Subjective:  Pt. Stated \"I'm sore the next day after I have therapy, but it calms down\"      Objective:    Updated POC to be completed by 9/9/23    INTERVENTION: COMPLETED: SPECIFICS/COMMENTS:   Modality:     Fluidotherapy x -15 mins for desensitization and AROM    E-stim  -PPR7 for pain management involving nerves. Int- 8.    US  -3.3 mhz at 50% int- 0.8 for 7 mins to L forearm. AROM:     LUE    x  x     -UBE- 3 mins forward and 3 mins backwards -Scrubbing ax for 3 mins   -kpj-r-zsuzw- 6 reps (global ax for LUE)        FMC/ In hand manipulation      L hand  x      X                  x -bunchems deconstruction (desensitization)  -in hand manipulation with x-small beads. (6-12 at a time)  -Exercise spheres- CW and CCW- 20 reps each  -In hand manipulation with coins- (12 coins at a time)  -Rubber band ex's-  digit extension -10 reps each   -Football fidget cube- 2 mins   -Rice bin with objects for desensitization. -FM with x- small nuts/bolts to thread with alt opposition   -desensitization with different textures- 10 mins   -swirling marble in container with outstretched arm CW and CCW- 30 secs each.    -Purdue pegboard ax   LUE

## 2023-09-25 ENCOUNTER — TREATMENT (OUTPATIENT)
Dept: PHYSICAL THERAPY | Age: 48
End: 2023-09-25
Payer: COMMERCIAL

## 2023-09-25 ENCOUNTER — TREATMENT (OUTPATIENT)
Dept: OCCUPATIONAL THERAPY | Age: 48
End: 2023-09-25
Payer: COMMERCIAL

## 2023-09-25 DIAGNOSIS — S81.852A OPEN BITE OF LEFT LOWER LEG, INITIAL ENCOUNTER: Primary | ICD-10-CM
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
## 2023-09-25 DIAGNOSIS — S51.852A DOG BITE OF FOREARM, LEFT, INITIAL ENCOUNTER: Primary | ICD-10-CM

## 2023-09-25 PROCEDURE — 97110 THERAPEUTIC EXERCISES: CPT

## 2023-09-25 PROCEDURE — 97110 THERAPEUTIC EXERCISES: CPT | Performed by: OCCUPATIONAL THERAPY ASSISTANT

## 2023-09-25 PROCEDURE — 97022 WHIRLPOOL THERAPY: CPT | Performed by: OCCUPATIONAL THERAPY ASSISTANT

## 2023-09-25 PROCEDURE — 97530 THERAPEUTIC ACTIVITIES: CPT | Performed by: OCCUPATIONAL THERAPY ASSISTANT

## 2023-09-25 PROCEDURE — 97140 MANUAL THERAPY 1/> REGIONS: CPT | Performed by: OCCUPATIONAL THERAPY ASSISTANT

## 2023-09-25 PROCEDURE — 97530 THERAPEUTIC ACTIVITIES: CPT

## 2023-09-25 NOTE — PROGRESS NOTES
Physical Therapy Daily Treatment Note    Date: 2023  Patient Name: Lesley Speaker  : 1975   MRN: [id number]  DOInjury: 23   DOSx: --    Referring Provider:   Grace Azar, [address] [place] [address] Diagnosis:    Diagnosis Orders   1. Open bite of left lower leg, initial encounter              Stephanie Rush has hypersensitivity of the L LE along with limb weakness. Today, we started on a desensitization home program that is to be done minimum of 4 times a day; progressing to 8 times per day. We will add exercise into her program at follow-up visits to aid LE function and to serve as desensitizers as well. HEP: Provided desensitization program from  linked here: [email or web address]    Access Code: [email or web address]  URL: [email or web address]  Date: 2023  Prepared by: Carmella Hilario    Exercises  - Seated Ankle Alphabet  - 2 x daily - 1 sets  - Towel Scrunches  - 2 x daily - 2 sets - 20 reps  - Ball Rolling  - 2 x daily - 2 sets - 20 reps      X = TO BE PERFORMED NEXT VISIT  > = PROGRESS TO THIS    S: Patient  reports still having about the same soreness as usual  O:   Time 2832-7703 am     Visit     Claim # [id number]  Dx: O56.948J  DOI: 2023   approved 16 visits through 2023 Repeat outcome measure at mid point and end. Pain Pain 4/10     ROM      Modalities         MO   Manual            Exercise      Desensitization program Provided. Link above. Discussed in detail. Continue to review and emphasize.        Recumbent Bike L5 x 7  min  TE   Ankle Alphabet  NR   Ball / can rolling  NR   Toe curls HEP NR   BAPS  progress from seated > standing with involved foot on board other foot on floor > standing 2-footed > standing 1-footed Left leg  L3 2 x 20 reps   Standing in all directions     Air-Ex Balance / Weight shifting     Air-Ex Calf raises 2 X 20 reps     Air-Ex March 2 X 20 reps No

## 2023-09-25 NOTE — PROGRESS NOTES
OCCUPATIONAL THERAPY DAILY NOTE  [address] [facility] THERAPY  [address] [address] [address]  Dept: [phone]  Loc: [phone]   Magdalena Hopeing OT Fax: [phone]      Date:  2023  Initial Evaluation Date: 23  Evaluating Therapist: Rod Fernandez OT/L     Patient Name:  Vish Jett    :  1975    Restrictions/Precautions:   Activity as tolerated, Low fall risk  Diagnosis:  S51.852A- Dog bite of forearm, left                                                         Date of Surgery/Injury:      Insurance/Certification information:  [address] [facility] #[id number]/ [phone]  Plan of care signed (Y/N): N  Visit# / total visits:  approved visits (New C-9 for additional visits through 10/31/23 for 12 more visits.)     Referring Practitioner:  Violeta  PA-C  Specific Practitioner Orders: OT evaluate and treat     Assessment of current deficits   [] Functional mobility             [] ADLs           [x] Strength                  [] Cognition   [] Functional transfers           [x] IADLs          [] Safety Awareness  [] Endurance   [x] Fine Motor Coordination    [] Balance      [] Vision/perception    [x] Sensation     [] Gross Motor Coordination [] ROM           [x] Pain                        [] Edema          [] Scar Adhesion/Skin Integrity      OT PLAN OF CARE   OT POC based on physician orders, patient diagnosis and results of clinical assessment     Frequency/Duration: 2x/ week x 6 weeks (Frequency decreased from the recommended 3x/ week due to minimizing further trauma to the nerve injury)     Specific OT Treatment to include:   [x] Instruction in HEP                   Modalities:  [x] Therapeutic Exercise                 [x] Ultrasound               [] Electrical Stimulation/Attended  [x] PROM/Stretching                    [x] Fluidotherapy          [x]  Paraffin                   [x] AAROM  [x] AROM                 []

## 2023-09-27 ENCOUNTER — TREATMENT (OUTPATIENT)
Dept: OCCUPATIONAL THERAPY | Age: 48
End: 2023-09-27

## 2023-09-27 ENCOUNTER — TREATMENT (OUTPATIENT)
Dept: PHYSICAL THERAPY | Age: 48
End: 2023-09-27

## 2023-09-27 DIAGNOSIS — S51.852A DOG BITE OF FOREARM, LEFT, INITIAL ENCOUNTER: Primary | ICD-10-CM

## 2023-09-27 DIAGNOSIS — S81.852A OPEN BITE OF LEFT LOWER LEG, INITIAL ENCOUNTER: Primary | ICD-10-CM
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
## 2023-09-27 NOTE — PROGRESS NOTES
Continues Plan of care with focus on ROM, Desensitization, nerve pain and strength/endurance of LUE. : Treatment covered based on POC and graduated to patient's progress. Pt education continues at each visit to obtain maximum benefits from skilled OT intervention.   []  [facility] care:   []  Discharge:      RONNELL Salazar/YUDITH [id number]

## 2023-09-27 NOTE — PROGRESS NOTES
2-leg  TE   Leg Press 1-leg Left leg 20# 3 x 10 ea LE  TE   Hamstring Curl Machine >?  TE   Knee Extension Machine >?  TE   Step-ups - FWD 7\" 2 x 20 reps  TA   Step-ups - LAT 7\" 2 x 20 reps  TA   Step-ups - BWD >  TA   Lunges Add back in     Single leg dead lift \" TA   Upper sorbian split squat >  TA               A: Tolerated well. Pt able to perform all ex's with continued c/o decrease sensation around bite  marks on legs , L arm feels better then legs . P: Continue with rehab plan  Martita Comfort, PTA    Treatment Charges: Mins Units   Initial Evaluation     Re-Evaluation     Ther Exercise         TE 10 1   Manual Therapy     MT     Ther Activities        TA 20 1   Gait Training          GT     Neuro Re-education NR     Modalities     Non-Billable Service Time     Other     Total Time/Units 30 2

## 2023-10-02 ENCOUNTER — TREATMENT (OUTPATIENT)
Dept: OCCUPATIONAL THERAPY | Age: 48
End: 2023-10-02
Payer: COMMERCIAL

## 2023-10-02 ENCOUNTER — TREATMENT (OUTPATIENT)
Dept: PHYSICAL THERAPY | Age: 48
End: 2023-10-02
Payer: COMMERCIAL
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
## 2023-10-02 DIAGNOSIS — S51.852A DOG BITE OF FOREARM, LEFT, INITIAL ENCOUNTER: Primary | ICD-10-CM

## 2023-10-02 DIAGNOSIS — S81.852A OPEN BITE OF LEFT LOWER LEG, INITIAL ENCOUNTER: Primary | ICD-10-CM

## 2023-10-02 PROCEDURE — 97530 THERAPEUTIC ACTIVITIES: CPT | Performed by: OCCUPATIONAL THERAPY ASSISTANT

## 2023-10-02 PROCEDURE — 97022 WHIRLPOOL THERAPY: CPT | Performed by: OCCUPATIONAL THERAPY ASSISTANT

## 2023-10-02 PROCEDURE — 97110 THERAPEUTIC EXERCISES: CPT

## 2023-10-02 PROCEDURE — 97530 THERAPEUTIC ACTIVITIES: CPT

## 2023-10-02 PROCEDURE — 97110 THERAPEUTIC EXERCISES: CPT | Performed by: OCCUPATIONAL THERAPY ASSISTANT

## 2023-10-02 NOTE — PROGRESS NOTES
-Purdue pegboard ax   LUE  (global)  -OH pulleys- 5 mins     PROM/Stretching:     LUE X  x -ulnar nerve glides- 10 reps   -Wrist extension stretch into power web- 10 reps with 10 sec holds. Scar Mass/Edema Control:     Soft tissue massage x -L forearm with  or without graston tool. Strengthening:     L hand          x     -Green 3# digiflex ex's- composite- 20 reps individual-20 reps   -Yellow theraputty ex's- - 10 reps and roll/pinch-3x. (HEP)  -Power hand gripper 2nd setting to pick and pass pom poms  -Red clothespin ax to pinch and pass pom poms   LUE     x    x -Red theraband ex's- high, middle and low rows. - 10 reps each   -Yellow 6# larissa bar- bend up/down and twists- 15 reps. -2# Dumbbell ex's- multiple planes- 10 reps each. Other: HEP     Ulnar nerve glides x    Compression sleeve  x -Size E tubigrip to help decrease pain. Assessment/Comments: Pt. Tolerated all exercises and stretches today. Now that pain is under control, we are progressing towards more strength/conditioning ax's to increase independence to RTW. -Rehab Potential: Good   -Patient Response to Treatment: Pt. Is happy with her progress so far. Patient. Education:  [x] Plans/Goals, Risks/Benefits discussed  [x] Home exercise program  Method of Education: [x] Verbal  [x] Demo  [] Written  Comprehension of Education:  [x] Verbalizes understanding. [x] Demonstrates understanding. [] Needs Review. [] Demonstrates/verbalizes understanding of HEP/Ed previously given.       Time In: 1000          Time Out: 1100     CODE  Minutes Time in- time out  Units   14778 Fluidotherapy 15 1057-1811 1   99628 Paraffin      10421 Ultrasound      33059 Electrical Stim - Attended      18176 Therapeutic Ex 30 6501-9943 2   78507 Therapeutic Activity 15 4475-7864 1   46049 Manual Therapy      50720 Orthotic Management/Training       Other                     Total  60  4       Plan: OT 2x/week for 12 sessions    [x]

## 2023-10-02 NOTE — PROGRESS NOTES
Physical Therapy Daily Treatment Note    Date: 10/2/2023  Patient Name: Nimo Ch  : 1975   MRN: [id number]  DOInjury: 23   DOSx: --    Referring Provider:   Courtney Mayens, Brian Camara  [address] [facility],  [address] [facility]         Medical Diagnosis:     1. Open bite of left lower leg, initial encounter          Bryan Call has hypersensitivity of the L LE along with limb weakness. Today, we started on a desensitization home program that is to be done minimum of 4 times a day; progressing to 8 times per day. We will add exercise into her program at follow-up visits to aid LE function and to serve as desensitizers as well. HEP: Provided desensitization program from  linked here: [email or web address]    Access Code: [email or web address]  URL: [email or web address]  Date: 2023  Prepared by: Jez Natacha    Exercises  - Seated Ankle Alphabet  - 2 x daily - 1 sets  - Towel Scrunches  - 2 x daily - 2 sets - 20 reps  - Ball Rolling  - 2 x daily - 2 sets - 20 reps      X = TO BE PERFORMED NEXT VISIT  > = PROGRESS TO THIS    S: Patient  reports her L was hurting a lot yesterday and also today . O:   Time 1100- 1130 am     Visit     Claim # [id number]  Dx: R07.374Q  DOI: 2023   approved 16 visits through 2023 Repeat outcome measure at mid point and end. Pain Pain 4/10     ROM      Modalities         MO   Manual            Exercise      Desensitization program Provided. Link above. Discussed in detail. Continue to review and emphasize.        Recumbent Bike L5 x  9  min  TE   Ankle Alphabet  NR   Ball / can rolling  NR   Toe curls HEP NR   BAPS  progress from seated > standing with involved foot on board other foot on floor > standing 2-footed > standing 1-footed Left leg  L3 2 x 20 reps   Standing in all directions     Air-Ex Balance / Weight shifting     Air-Ex Calf raises 2 X 20 reps     Air-Ex March 2 X 20 reps No hand hold

## 2023-10-04 ENCOUNTER — TREATMENT (OUTPATIENT)
Dept: OCCUPATIONAL THERAPY | Age: 48
End: 2023-10-04

## 2023-10-04 ENCOUNTER — OFFICE VISIT (OUTPATIENT)
Dept: PAIN MANAGEMENT | Age: 48
End: 2023-10-04
Payer: COMMERCIAL

## 2023-10-04 ENCOUNTER — TREATMENT (OUTPATIENT)
Dept: PHYSICAL THERAPY | Age: 48
End: 2023-10-04

## 2023-10-04 VITALS
TEMPERATURE: 97.3 F | SYSTOLIC BLOOD PRESSURE: 128 MMHG | DIASTOLIC BLOOD PRESSURE: 78 MMHG | RESPIRATION RATE: 18 BRPM | BODY MASS INDEX: 32.44 KG/M2 | HEIGHT: 64 IN | HEART RATE: 68 BPM | WEIGHT: 190 LBS | OXYGEN SATURATION: 98 %

## 2023-10-04 DIAGNOSIS — S21.051D: ICD-10-CM

## 2023-10-04 DIAGNOSIS — S71.151D: ICD-10-CM
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
## 2023-10-04 DIAGNOSIS — S51.852A DOG BITE OF FOREARM, LEFT, INITIAL ENCOUNTER: Primary | ICD-10-CM

## 2023-10-04 DIAGNOSIS — G89.4 CHRONIC PAIN SYNDROME: Primary | ICD-10-CM

## 2023-10-04 DIAGNOSIS — S81.802D WOUND OF LEFT LOWER EXTREMITY, SUBSEQUENT ENCOUNTER: ICD-10-CM

## 2023-10-04 DIAGNOSIS — S31.159D: ICD-10-CM

## 2023-10-04 DIAGNOSIS — S80.01XD CONTUSION OF RIGHT KNEE, SUBSEQUENT ENCOUNTER: ICD-10-CM

## 2023-10-04 DIAGNOSIS — S51.852D OPEN BITE OF LEFT FOREARM, SUBSEQUENT ENCOUNTER: ICD-10-CM

## 2023-10-04 DIAGNOSIS — S81.852A OPEN BITE OF LEFT LOWER LEG, INITIAL ENCOUNTER: Primary | ICD-10-CM

## 2023-10-04 PROBLEM — S21.051A: Status: ACTIVE | Noted: 2023-10-04

## 2023-10-04 PROBLEM — S81.802A WOUND OF LEFT LEG: Status: ACTIVE | Noted: 2023-10-04

## 2023-10-04 PROBLEM — S31.159A: Status: ACTIVE | Noted: 2023-10-04

## 2023-10-04 PROBLEM — S80.01XA CONTUSION OF RIGHT KNEE: Status: ACTIVE | Noted: 2023-10-04

## 2023-10-04 PROBLEM — S71.151A: Status: ACTIVE | Noted: 2023-10-04

## 2023-10-04 PROCEDURE — 99204 OFFICE O/P NEW MOD 45 MIN: CPT | Performed by: PAIN MEDICINE

## 2023-10-04 RX ORDER — GABAPENTIN 100 MG/1
CAPSULE ORAL
COMMUNITY
Start: 2023-08-14

## 2023-10-04 RX ORDER — DULOXETIN HYDROCHLORIDE 30 MG/1
30 CAPSULE, DELAYED RELEASE ORAL DAILY
Qty: 30 CAPSULE | Refills: 0 | Status: SHIPPED | OUTPATIENT
Start: 2023-10-04 | End: 2023-11-03

## 2023-10-04 RX ORDER — LEVOTHYROXINE SODIUM 0.03 MG/1
25 TABLET ORAL DAILY
COMMUNITY

## 2023-10-04 RX ORDER — IBUPROFEN 800 MG/1
TABLET ORAL
COMMUNITY
Start: 2023-08-07

## 2023-10-04 RX ORDER — SEMAGLUTIDE 1.34 MG/ML
INJECTION, SOLUTION SUBCUTANEOUS
COMMUNITY
Start: 2023-07-09

## 2023-10-04 RX ORDER — TRAZODONE HYDROCHLORIDE 50 MG/1
50 TABLET ORAL
COMMUNITY
Start: 2023-09-27

## 2023-10-04 NOTE — PROGRESS NOTES
Physical Therapy Daily Treatment Note    Date: 10/4/2023  Patient Name: Nadia Soto  : 1975   MRN: [id number]  DOInjury: 23   DOSx: --    Referring Provider:   Brian Rivers [address],  [address] [facility]         Medical Diagnosis:     1. Open bite of left lower leg, initial encounter          Marcos Ramirez has hypersensitivity of the L LE along with limb weakness. Today, we started on a desensitization home program that is to be done minimum of 4 times a day; progressing to 8 times per day. We will add exercise into her program at follow-up visits to aid LE function and to serve as desensitizers as well. HEP: Provided desensitization program from  linked here: [email or web address]    Access Code: [email or web address]  URL: [email or web address]  Date: 2023  Prepared by: Gladys Munguia    Exercises  - Seated Ankle Alphabet  - 2 x daily - 1 sets  - Towel Scrunches  - 2 x daily - 2 sets - 20 reps  - Ball Rolling  - 2 x daily - 2 sets - 20 reps      X = TO BE PERFORMED NEXT VISIT  > = PROGRESS TO THIS    S: Patient  reports being sore again today   . O:   Time 1100- 1130 am     Visit     Claim # [id number]  Dx: M72.843I  DOI: 2023   approved 16 visits through 2023 Repeat outcome measure at mid point and end. Pain Pain 4/10     ROM      Modalities         MO   Manual            Exercise      Desensitization program Provided. Link above. Discussed in detail. Continue to review and emphasize.        Recumbent Bike L5 x  9  min  TE   Ankle Alphabet  NR   Ball / can rolling  NR   Toe curls HEP NR   BAPS  progress from seated > standing with involved foot on board other foot on floor > standing 2-footed > standing 1-footed Left leg  L3 2 x 20 reps   Standing in all directions     Air-Ex Balance / Weight shifting     Air-Ex Calf raises 2 X 20 reps     Air-Ex March 2 X 20 reps No hand hold    Leg Press 2-leg  TE

## 2023-10-04 NOTE — PROGRESS NOTES
LUE  (global)  -OH pulleys- 5 mins     PROM/Stretching:     LUE X  x -ulnar nerve glides- 10 reps   -Wrist extension stretch into power web- 10 reps with 10 sec holds. Scar Mass/Edema Control:     Soft tissue massage x -L forearm with  or without graston tool. Strengthening:     L hand  x        x     -Green 3# digiflex ex's- composite- 20 reps individual-20 reps   -Yellow theraputty ex's- - 10 reps and roll/pinch-3x. (HEP)  -Power hand gripper 3rd^ setting to pick and pass pom poms  -Red clothespin ax to pinch and pass pom poms   LUE     x    X  x -Red theraband ex's- high, middle and low rows. - 10 reps each   -Red 10#^ larissa bar- bend up/down and twists- 15 reps. -2# Dumbbell ex's- multiple planes- 10 reps each. -  with 2# wt. Close - 2 reps and wide - 2 reps    Other: HEP     Ulnar nerve glides x    Compression sleeve  x -Size E tubigrip to help decrease pain. Assessment/Comments: Pt. Tolerated all exercises and stretches today. Focus continues to be on strength and conditioning while monitoring pain. Pt. Did report mild pain in forearm at end of session today after performing two new resistive exercises today. Will continue to advance as tolerated. -Rehab Potential: Good   -Patient Response to Treatment: Pt. Is happy with her progress so far. Patient. Education:  [x] Plans/Goals, Risks/Benefits discussed  [x] Home exercise program  Method of Education: [x] Verbal  [x] Demo  [] Written  Comprehension of Education:  [x] Verbalizes understanding. [x] Demonstrates understanding. [] Needs Review. [] Demonstrates/verbalizes understanding of HEP/Ed previously given.       Time In: 1100          Time Out: 1200     CODE  Minutes Time in- time out  Units   15366 Fluidotherapy 15 1901-0499 1   30544 Paraffin      74626 Ultrasound      09099 Electrical Stim - Attended      67803 Therapeutic Ex 20 2904-5220 1   00222 Therapeutic Activity 15 0747-1708 1   29924

## 2023-10-09 ENCOUNTER — TELEPHONE (OUTPATIENT)
Dept: PHYSICAL THERAPY | Age: 48
End: 2023-10-09

## 2023-10-12 LAB
ALBUMIN SERPL-MCNC: 4.5 G/DL (ref 3.5–5.2)
ALP SERPL-CCNC: 85 U/L (ref 35–104)
ALT SERPL-CCNC: 25 U/L (ref 0–32)
ANION GAP SERPL CALCULATED.3IONS-SCNC: 12 MMOL/L (ref 7–16)
AST SERPL-CCNC: 27 U/L (ref 0–31)
BILIRUB SERPL-MCNC: 0.3 MG/DL (ref 0–1.2)
BUN SERPL-MCNC: 12 MG/DL (ref 6–20)
CALCIUM SERPL-MCNC: 9.2 MG/DL (ref 8.6–10.2)
CHLORIDE SERPL-SCNC: 102 MMOL/L (ref 98–107)
CO2 SERPL-SCNC: 22 MMOL/L (ref 22–29)
CREAT SERPL-MCNC: 0.7 MG/DL (ref 0.5–1)
ERYTHROCYTE [DISTWIDTH] IN BLOOD BY AUTOMATED COUNT: 14.3 % (ref 11.5–15)
GFR SERPL CREATININE-BSD FRML MDRD: >60 ML/MIN/1.73M2
GLUCOSE SERPL-MCNC: 139 MG/DL (ref 74–99)
HBA1C MFR BLD: 5.8 % (ref 4–5.6)
HCT VFR BLD AUTO: 41.8 % (ref 34–48)
HGB BLD-MCNC: 13.2 G/DL (ref 11.5–15.5)
MCH RBC QN AUTO: 26.5 PG (ref 26–35)
MCHC RBC AUTO-ENTMCNC: 31.6 G/DL (ref 32–34.5)
MCV RBC AUTO: 83.8 FL (ref 80–99.9)
PLATELET # BLD AUTO: 642 K/UL (ref 130–450)
PMV BLD AUTO: 10.8 FL (ref 7–12)
POTASSIUM SERPL-SCNC: 4.6 MMOL/L (ref 3.5–5)
PROT SERPL-MCNC: 8 G/DL (ref 6.4–8.3)
RBC # BLD AUTO: 4.99 M/UL (ref 3.5–5.5)
SODIUM SERPL-SCNC: 136 MMOL/L (ref 132–146)
WBC OTHER # BLD: 10.5 K/UL (ref 4.5–11.5)

## 2023-10-14 LAB
MICROORGANISM SPEC CULT: ABNORMAL
SPECIMEN DESCRIPTION: ABNORMAL

## 2023-10-16 ENCOUNTER — TREATMENT (OUTPATIENT)
Dept: OCCUPATIONAL THERAPY | Age: 48
End: 2023-10-16

## 2023-10-16 ENCOUNTER — TREATMENT (OUTPATIENT)
Dept: PHYSICAL THERAPY | Age: 48
End: 2023-10-16

## 2023-10-16 DIAGNOSIS — S81.852A OPEN BITE OF LEFT LOWER LEG, INITIAL ENCOUNTER: Primary | ICD-10-CM
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
## 2023-10-16 DIAGNOSIS — S51.852A DOG BITE OF FOREARM, LEFT, INITIAL ENCOUNTER: Primary | ICD-10-CM

## 2023-10-16 NOTE — PROGRESS NOTES
sleeping through the night due to night baig from incident, leg pain from bites and numbness and tingling in the arm. 5) Increase in fine motor function evidenced by ability to write and type 20 words without significant impairments. Progress noted. Pt. Able to write 20 word without rest, but had increased aching and tingling from the 7th word on. 6) Patient to report a decrease in hypersensitivity from 100% to 20% or less in their distal L arm. Progress noted. Pt. Reports hypersensitivity at 50% now. 7) Patient will decrease QuickDASH score to 25% or less for increased participation in daily functional activities. Progress noted, QuickDASH score decreased from 59% to 40% today. TODAY'S TREATMENT     Pain Level:   1-2 on scale of 1-10, aching, needle-like, and throbbing    Subjective:  Pt. Stated \"I started the cymbalta and it seems to be helping my leg\"      Objective:    Updated POC to be completed by 9/9/23    INTERVENTION: COMPLETED: SPECIFICS/COMMENTS:   Modality:     Fluidotherapy x -15 mins for desensitization and AROM    E-stim  -PPR7 for pain management involving nerves. Int- 8.    US  -3.3 mhz at 50% int- 0.8 for 7 mins to L forearm. AROM:     LUE        x   -UBE- 3 mins forward and 3 mins backwards (1 min with LUE in each direction)  -Scrubbing ax for 3 mins   -dcn-a-[id number]- 6 reps (global ax for LUE)        FMC/ In hand manipulation      L hand  x      X    x             -bunchems deconstruction (desensitization)  -in hand manipulation with x-small beads. (6-12 at a time)  -Exercise spheres- CW and CCW- 20 reps each  -In hand manipulation with coins- (12 coins at a time)  -Rubber band ex's-  digit extension -10 reps each   -Football fidget cube- 2 mins   -Rice bin with objects for desensitization. -FM with x- small nuts/bolts to thread with alt opposition   -desensitization with different textures- 10 mins   -swirling marble in container with outstretched arm CW and CCW- 30 secs each.

## 2023-10-16 NOTE — PROGRESS NOTES
Physical Therapy Daily Treatment Note    Date: 10/16/2023  Patient Name: Donaldo Tidwell  : 1975   MRN: [id number]  DOInjury: 23   DOSx: --    Referring Provider:   Brian Jose  [address] [facility],  [address] [facility]         Medical Diagnosis:     1. Open bite of left lower leg, initial encounter          Arina Ang has hypersensitivity of the L LE along with limb weakness. Today, we started on a desensitization home program that is to be done minimum of 4 times a day; progressing to 8 times per day. We will add exercise into her program at follow-up visits to aid LE function and to serve as desensitizers as well. HEP: Provided desensitization program from  linked here: [email or web address]    Access Code: [email or web address]  URL: [email or web address]  Date: 2023  Prepared by: Alfred Billyws    Exercises  - Seated Ankle Alphabet  - 2 x daily - 1 sets  - Towel Scrunches  - 2 x daily - 2 sets - 20 reps  - Ball Rolling  - 2 x daily - 2 sets - 20 reps      X = TO BE PERFORMED NEXT VISIT  > = PROGRESS TO THIS    S: Patient  reports being sore again today   . O:   Time 1100- 1140 am     Visit 15/16    Claim # [id number]  Dx: P87.944R  DOI: 2023   approved 16 visits through 2023 Repeat outcome measure at mid point and end. Pain Pain 4/10     ROM      Modalities         MO   Manual            Exercise      Desensitization program Provided. Link above. Discussed in detail. Continue to review and emphasize.        Recumbent Bike L5 x  9  min  TE   Ankle Alphabet  NR   Ball / can rolling  NR   Toe curls HEP NR   BAPS  progress from seated > standing with involved foot on board other foot on floor > standing 2-footed > standing 1-footed Left leg  L3 2 x 20 reps   Standing in all directions     Air-Ex Balance / Weight shifting     Air-Ex Calf raises 2 X 20 reps     Air-Ex March 2 X 20 reps No hand hold    Leg Press 2-leg  TE

## 2023-10-18 ENCOUNTER — TREATMENT (OUTPATIENT)
Dept: PHYSICAL THERAPY | Age: 48
End: 2023-10-18

## 2023-10-18 ENCOUNTER — TREATMENT (OUTPATIENT)
Dept: OCCUPATIONAL THERAPY | Age: 48
End: 2023-10-18

## 2023-10-18 DIAGNOSIS — S81.852A OPEN BITE OF LEFT LOWER LEG, INITIAL ENCOUNTER: Primary | ICD-10-CM
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
## 2023-10-18 DIAGNOSIS — S51.852A DOG BITE OF FOREARM, LEFT, INITIAL ENCOUNTER: Primary | ICD-10-CM

## 2023-10-18 NOTE — PROGRESS NOTES
sleeping through the night due to night baig from incident, leg pain from bites and numbness and tingling in the arm. 5) Increase in fine motor function evidenced by ability to write and type 20 words without significant impairments. Progress noted. Pt. Able to write 20 word without rest, but had increased aching and tingling from the 7th word on. 6) Patient to report a decrease in hypersensitivity from 100% to 20% or less in their distal L arm. Progress noted. Pt. Reports hypersensitivity at 50% now. 7) Patient will decrease QuickDASH score to 25% or less for increased participation in daily functional activities. Progress noted, QuickDASH score decreased from 59% to 40% today. TODAY'S TREATMENT     Pain Level:   1-2 on scale of 1-10, aching, needle-like, and throbbing    Subjective:  Pt. Stated \"I feel better today\"      Objective:    Updated POC to be completed by 9/9/23    INTERVENTION: COMPLETED: SPECIFICS/COMMENTS:   Modality:     Fluidotherapy x -15 mins for desensitization and AROM    E-stim  -PPR7 for pain management involving nerves. Int- 8.    US  -3.3 mhz at 50% int- 0.8 for 7 mins to L forearm. AROM:     LUE           -UBE- 3 mins forward and 3 mins backwards (1 min with LUE in each direction)  -Scrubbing ax for 3 mins   -gqe-v-xrmkq- 6 reps (global ax for LUE)        FMC/ In hand manipulation      L hand  x      X               -bunchems deconstruction (desensitization)  -in hand manipulation with x-small beads. (6-12 at a time)  -Exercise spheres- CW and CCW- 20 reps each  -In hand manipulation with coins- (12 coins at a time)  -Rubber band ex's-  digit extension -10 reps each   -Football fidget cube- 2 mins   -Rice bin with objects for desensitization. -FM with x- small nuts/bolts to thread with alt opposition   -desensitization with different textures- 10 mins   -swirling marble in container with outstretched arm CW and CCW- 30 secs each.    -Purdue pegboard ax   LUE  (global)

## 2023-10-18 NOTE — PROGRESS NOTES
Physical Therapy Daily Treatment Note    Date: 10/18/2023  Patient Name: Patsy Huff  : 1975   MRN: [id number]  DOInjury: 23   DOSx: --    Referring Provider:   Brian Alexander  [address] [facility],  [address]         Medical Diagnosis:     1. Open bite of left lower leg, initial encounter          Wero Han has hypersensitivity of the L LE along with limb weakness. Today, we started on a desensitization home program that is to be done minimum of 4 times a day; progressing to 8 times per day. We will add exercise into her program at follow-up visits to aid LE function and to serve as desensitizers as well. HEP: Provided desensitization program from  linked here: [email or web address]    Access Code: [email or web address]  URL: [email or web address]  Date: 2023  Prepared by: Maryellen Player    Exercises  - Seated Ankle Alphabet  - 2 x daily - 1 sets  - Towel Scrunches  - 2 x daily - 2 sets - 20 reps  - Ball Rolling  - 2 x daily - 2 sets - 20 reps      X = TO BE PERFORMED NEXT VISIT  > = PROGRESS TO THIS    S: Pt reports no new complaints. Continues with altered sensation in left LE.  O:   Time 7585-5376      Visit     Claim # [id number]  Dx: Z78.625N  DOI: 2023   approved 16 visits through 2023 Repeat outcome measure at mid point and end. Pain Pain 4/10     ROM      Modalities         MO   Manual            Exercise      Desensitization program Provided. Link above. Discussed in detail. Continue to review and emphasize.        Recumbent Bike L5 x  9  min  TE   Ankle Alphabet  NR   Ball / can rolling  NR   Toe curls HEP NR   BAPS  progress from seated > standing with involved foot on board other foot on floor > standing 2-footed > standing 1-footed Left leg  L3 2 x 20 reps   Standing in all directions     Air-Ex Balance / Weight shifting     Air-Ex Calf raises 2 X 20 reps     Air-Ex March 2 X 20 reps No hand hold

## 2023-10-23 ENCOUNTER — TREATMENT (OUTPATIENT)
Dept: OCCUPATIONAL THERAPY | Age: 48
End: 2023-10-23
Payer: COMMERCIAL
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
## 2023-10-23 DIAGNOSIS — S51.852A DOG BITE OF FOREARM, LEFT, INITIAL ENCOUNTER: Primary | ICD-10-CM

## 2023-10-23 PROCEDURE — 97110 THERAPEUTIC EXERCISES: CPT | Performed by: OCCUPATIONAL THERAPY ASSISTANT

## 2023-10-23 PROCEDURE — 97140 MANUAL THERAPY 1/> REGIONS: CPT | Performed by: OCCUPATIONAL THERAPY ASSISTANT

## 2023-10-23 PROCEDURE — 97022 WHIRLPOOL THERAPY: CPT | Performed by: OCCUPATIONAL THERAPY ASSISTANT

## 2023-10-23 NOTE — PROGRESS NOTES
each.   -Purdue pegboard ax   LUE  (global)  -OH pulleys- 5 mins     PROM/Stretching:     LUE X  x -ulnar nerve glides- 10 reps   -Wrist extension stretch into power web- 10 reps with 10 sec holds. Scar Mass/Edema Control:     Soft tissue massage x -L forearm with  or without graston tool. Strengthening:     L hand  x        x    x -Green 3# digiflex ex's- composite- 20 reps individual-20 reps   -Yellow theraputty ex's- - 10 reps and roll/pinch-3x. (HEP)  -Power hand gripper 3rd setting Dynamic- 20 reps static- 10 reps with 10 count. -Green^ clothespin ax to pinch and pass pom poms   LUE         X          x -Red theraband ex's- high, middle and low rows. - 10 reps each   -Red 10#^ larissa bar- bend up/down and twists- 15 reps. -3# Dumbbell ex's- multiple planes- 10 reps x2 each. -  with 2# wt. Close - 2 reps and wide - 2 reps   -Cable machine- high, middle and low rows- 15# 10 reps x2 each. -Body blade- 3 planes- 30 secs each    Other: HEP     Ulnar nerve glides x    Compression sleeve  x -Size E tubigrip to help decrease pain. Assessment/Comments: Pt. Tolerated all exercises and stretches today. Focus on strengthening of LUE today to condition for RTW. Will continue to advance as tolerated. -Rehab Potential: Good   -Patient Response to Treatment: Pt. Is happy with her progress so far. Patient. Education:  [x] Plans/Goals, Risks/Benefits discussed  [x] Home exercise program  Method of Education: [x] Verbal  [x] Demo  [] Written  Comprehension of Education:  [x] Verbalizes understanding. [x] Demonstrates understanding. [] Needs Review. [] Demonstrates/verbalizes understanding of HEP/Ed previously given.       Time In: 1000          Time Out: 1100     CODE  Minutes Time in- time out  Units   74034 Fluidotherapy 15 0933-5780 1   59193 Paraffin      89849 Ultrasound      53196 Electrical Stim - Attended      10904 Therapeutic Ex 30 4116-7908 2   [id number]

## 2023-10-25 ENCOUNTER — TREATMENT (OUTPATIENT)
Dept: OCCUPATIONAL THERAPY | Age: 48
End: 2023-10-25

## 2023-10-25 ENCOUNTER — OFFICE VISIT (OUTPATIENT)
Dept: PAIN MANAGEMENT | Age: 48
End: 2023-10-25
Payer: COMMERCIAL

## 2023-10-25 VITALS
SYSTOLIC BLOOD PRESSURE: 116 MMHG | TEMPERATURE: 97.1 F | RESPIRATION RATE: 18 BRPM | DIASTOLIC BLOOD PRESSURE: 66 MMHG | WEIGHT: 190.04 LBS | OXYGEN SATURATION: 98 % | BODY MASS INDEX: 32.44 KG/M2 | HEART RATE: 74 BPM | HEIGHT: 64 IN

## 2023-10-25 DIAGNOSIS — S31.159D: ICD-10-CM

## 2023-10-25 DIAGNOSIS — S51.852D OPEN BITE OF LEFT FOREARM, SUBSEQUENT ENCOUNTER: ICD-10-CM

## 2023-10-25 DIAGNOSIS — S81.802D WOUND OF LEFT LOWER EXTREMITY, SUBSEQUENT ENCOUNTER: ICD-10-CM

## 2023-10-25 DIAGNOSIS — S71.151D: ICD-10-CM

## 2023-10-25 DIAGNOSIS — S51.852A DOG BITE OF FOREARM, LEFT, INITIAL ENCOUNTER: Primary | ICD-10-CM

## 2023-10-25 DIAGNOSIS — S21.051D: ICD-10-CM
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
## 2023-10-25 DIAGNOSIS — G89.4 CHRONIC PAIN SYNDROME: Primary | ICD-10-CM

## 2023-10-25 DIAGNOSIS — S80.01XD CONTUSION OF RIGHT KNEE, SUBSEQUENT ENCOUNTER: ICD-10-CM

## 2023-10-25 PROCEDURE — 99214 OFFICE O/P EST MOD 30 MIN: CPT | Performed by: PAIN MEDICINE

## 2023-10-25 PROCEDURE — 99213 OFFICE O/P EST LOW 20 MIN: CPT | Performed by: PAIN MEDICINE

## 2023-10-25 RX ORDER — PREGABALIN 75 MG/1
75 CAPSULE ORAL 2 TIMES DAILY
Qty: 60 CAPSULE | Refills: 0 | Status: SHIPPED | OUTPATIENT
Start: 2023-10-25 | End: 2023-11-24

## 2023-10-25 RX ORDER — SULFAMETHOXAZOLE AND TRIMETHOPRIM 800; 160 MG/1; MG/1
1 TABLET ORAL 2 TIMES DAILY
COMMUNITY
Start: 2023-10-12

## 2023-10-25 NOTE — PROGRESS NOTES
OCCUPATIONAL THERAPY DAILY NOTE  [address] [facility] THERAPY   [facility] [id number] [place] [address]  Dept: [phone]  Loc: [phone]   [facility] Daily OT Fax: [phone]      Date:  10/25/2023  Initial Evaluation Date: 23  Evaluating Therapist: Blanca Palmer OT/L     Patient Name:  Calos Da Silva    :  1975    Restrictions/Precautions:   Activity as tolerated, Low fall risk  Diagnosis:  S51.852A- Dog bite of forearm, left                                                         Date of Surgery/Injury:      Insurance/Certification information:  [address]  [facility]-  #[id number]/ [phone]  Plan of care signed (Y/N): N  Visit# / total visits:  approved visits (New C-9 for additional visits through 10/31/23 for 12 more visits.)     Referring Practitioner:  Isaías Whitley PA-C  Specific Practitioner Orders: OT evaluate and treat     Assessment of current deficits   [] Functional mobility             [] ADLs           [x] Strength                  [] Cognition   [] Functional transfers           [x] IADLs          [] Safety Awareness  [] Endurance   [x] Fine Motor Coordination    [] Balance      [] Vision/perception    [x] Sensation     [] Gross Motor Coordination [] ROM           [x] Pain                        [] Edema          [] Scar Adhesion/Skin Integrity      OT PLAN OF CARE   OT POC based on physician orders, patient diagnosis and results of clinical assessment     Frequency/Duration: 2x/ week x 6 weeks (Frequency decreased from the recommended 3x/ week due to minimizing further trauma to the nerve injury)     Specific OT Treatment to include:   [x] Instruction in HEP                   Modalities:  [x] Therapeutic Exercise                 [x] Ultrasound               [] Electrical Stimulation/Attended  [x] PROM/Stretching                    [x] Fluidotherapy          [x]  Paraffin                   [x] AAROM  [x] AROM                 []

## 2023-10-25 NOTE — PROGRESS NOTES
[address], [facility] [address]  [phone]    Follow up Note      Gia Ortega     Date of Visit:  10/25/2023    CC:  Patient presents for follow up   Chief Complaint   Patient presents with    Pain     Left leg     HPI:    Pain is better. Appropriate analgesia with current medications regimen: yes - . Change in quality of symptoms:no. Medication side effects: decreased Libido with Cymbalta   Recent diagnostic testing:none. Recent interventional procedures:none. .    She has not been on anticoagulation medications to include none. The patient  has not been on herbal supplements. The patient is diabetic. Imaging:   Left tib/fib  There is no evidence of fracture or dislocation. No significant  osteodegenerative or other osseous abnormalities are seen. No significant  surrounding soft tissue abnormality is identified. Previous treatments: Physical Therapy and medications. .         Potential Aberrant Drug-Related Behavior:    No    Urine Drug Screening:  None    OARRS report:  10/2023 consistent     Opioid Agreement:  Renewal date:N/A    Past Medical History:   Diagnosis Date    Dog bite     left lower leg, left arm, right breast, right abdomen, right knee, right thigh     Past Surgical History:   Procedure Laterality Date     SECTION      CHOLECYSTECTOMY      HERNIA REPAIR       Prior to Admission medications    Medication Sig Start Date End Date Taking?  Authorizing Provider   sulfamethoxazole-trimethoprim (BACTRIM DS;SEPTRA DS) 800-160 MG per tablet Take 1 tablet by mouth 2 times daily 10/12/23  Yes ProviderJeana MD   ibuprofen (ADVIL;MOTRIN) 800 MG tablet TAKE 1 TABLET BY MOUTH EVERY 6 TO 8 HOURS AS NEEDED FOR PAIN TAKE WITH FOOD 23  Yes Jeana Vences MD   levothyroxine (SYNTHROID) 25 MCG tablet Take 1 tablet by mouth daily   Yes Provider, Historical, MD   OZEMPIC, 1 MG/DOSE, 4 MG/3ML SOPN INJECT 1MG UNDER THE

## 2023-10-30 ENCOUNTER — TREATMENT (OUTPATIENT)
Dept: OCCUPATIONAL THERAPY | Age: 48
End: 2023-10-30
Payer: COMMERCIAL
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
## 2023-10-30 DIAGNOSIS — S51.852A DOG BITE OF FOREARM, LEFT, INITIAL ENCOUNTER: Primary | ICD-10-CM

## 2023-10-30 PROCEDURE — 97022 WHIRLPOOL THERAPY: CPT | Performed by: OCCUPATIONAL THERAPY ASSISTANT

## 2023-10-30 PROCEDURE — 97110 THERAPEUTIC EXERCISES: CPT | Performed by: OCCUPATIONAL THERAPY ASSISTANT

## 2023-10-30 PROCEDURE — 97140 MANUAL THERAPY 1/> REGIONS: CPT | Performed by: OCCUPATIONAL THERAPY ASSISTANT

## 2023-10-30 NOTE — PROGRESS NOTES
sleeping through the night due to night baig from incident, leg pain from bites and numbness and tingling in the arm. 5) Increase in fine motor function evidenced by ability to write and type 20 words without significant impairments. Progress noted. Pt. Able to write 20 word without rest, but had increased aching and tingling from the 7th word on. 6) Patient to report a decrease in hypersensitivity from 100% to 20% or less in their distal L arm. Progress noted. Pt. Reports hypersensitivity at 50% now. 7) Patient will decrease QuickDASH score to 25% or less for increased participation in daily functional activities. Progress noted, QuickDASH score decreased from 59% to 40% today. TODAY'S TREATMENT     Pain Level:   0-1 on scale of 1-10, aching, needle-like, and throbbing    Subjective:  Pt. Presented with no new complaints. Objective:    Updated POC to be completed by 10/9/23    INTERVENTION: COMPLETED: SPECIFICS/COMMENTS:   Modality:     Fluidotherapy x -10 mins for desensitization and AROM    E-stim  -PPR7 for pain management involving nerves. Int- 8.    US  -3.3 mhz at 50% int- 0.8 for 7 mins to L forearm. AROM:     LUE  x         -UBE- 3 mins forward and 3 mins backwards (2 min with LUE in each direction)  -Scrubbing ax for 3 mins   -hby-p-montt- 6 reps (global ax for LUE)        FMC/ In hand manipulation      L hand                   -bunchems deconstruction (desensitization)  -in hand manipulation with x-small beads. (6-12 at a time)  -Exercise spheres- CW and CCW- 20 reps each  -In hand manipulation with coins- (12 coins at a time)  -Rubber band ex's-  digit extension -10 reps each   -Football fidget cube- 2 mins   -Rice bin with objects for desensitization. -FM with x- small nuts/bolts to thread with alt opposition   -desensitization with different textures- 10 mins   -swirling marble in container with outstretched arm CW and CCW- 30 secs each.    -Purdue pegboard ax   LUE  (global)

## 2023-10-31 ENCOUNTER — TREATMENT (OUTPATIENT)
Dept: OCCUPATIONAL THERAPY | Age: 48
End: 2023-10-31
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
## 2023-10-31 DIAGNOSIS — S51.852A DOG BITE OF FOREARM, LEFT, INITIAL ENCOUNTER: Primary | ICD-10-CM

## 2023-10-31 NOTE — PROGRESS NOTES
write and type 20 words without significant impairments. Goal Met. Pt. Was able to write her full name 23 times with minor cramping, but it did not impair her writing skills. 6) Patient to report a decrease in hypersensitivity from 100% to 20% or less in their distal L arm. Goal Met. Pt. Reports hypersensitivity (itching in arm) approx 10% of the time. 7) Patient will decrease QuickDASH score to 25% or less for increased participation in daily functional activities. Goal Met QuickDASH score decreased from 59% to 40%, to 2.27% today. TODAY'S TREATMENT     Pain Level:   0 on scale of 1-10, aching, needle-like, and throbbing    Subjective:  Pt. Stated \"Today is my last day\"      Objective:    Updated POC to be completed by 10/9/23    INTERVENTION: COMPLETED: SPECIFICS/COMMENTS:   Modality:     Fluidotherapy x -10 mins for desensitization and AROM    E-stim  -PPR7 for pain management involving nerves. Int- 8.    US  -3.3 mhz at 50% int- 0.8 for 7 mins to L forearm. AROM:     LUE           -UBE- 3 mins forward and 3 mins backwards (2 min with LUE in each direction)  -Scrubbing ax for 3 mins   -ojv-z-gerrp- 6 reps (global ax for LUE)        FMC/ In hand manipulation      L hand        x           -bunchems deconstruction (desensitization)  -in hand manipulation with x-small beads. (6-12 at a time)  -Exercise spheres- CW and CCW- 20 reps each  -In hand manipulation with coins- (12 coins at a time)  -Rubber band ex's-  digit extension -10 reps each   -Football fidget cube- 2 mins   -Rice bin with objects for desensitization. -FM with x- small nuts/bolts to thread with alt opposition   -desensitization with different textures- 10 mins   -swirling marble in container with outstretched arm CW and CCW- 30 secs each. -Purdue pegboard ax   LUE  (global)  -OH pulleys- 5 mins     PROM/Stretching:     LUE X  x -ulnar nerve glides- 10 reps   -Wrist extension stretch into power web- 10 reps with 10 sec holds.

## 2023-11-24 RX ORDER — DULOXETIN HYDROCHLORIDE 30 MG/1
30 CAPSULE, DELAYED RELEASE ORAL DAILY
Qty: 30 CAPSULE | Refills: 0 | OUTPATIENT
Start: 2023-11-24 | End: 2023-12-24

## 2023-12-01 ENCOUNTER — OFFICE VISIT (OUTPATIENT)
Dept: PAIN MANAGEMENT | Age: 48
End: 2023-12-01
Payer: COMMERCIAL

## 2023-12-01 VITALS
TEMPERATURE: 97.3 F | DIASTOLIC BLOOD PRESSURE: 60 MMHG | OXYGEN SATURATION: 97 % | BODY MASS INDEX: 32.44 KG/M2 | WEIGHT: 190 LBS | HEART RATE: 83 BPM | SYSTOLIC BLOOD PRESSURE: 100 MMHG | HEIGHT: 64 IN | RESPIRATION RATE: 18 BRPM

## 2023-12-01 DIAGNOSIS — G89.4 CHRONIC PAIN SYNDROME: Primary | ICD-10-CM

## 2023-12-01 DIAGNOSIS — S51.852D OPEN BITE OF LEFT FOREARM, SUBSEQUENT ENCOUNTER: ICD-10-CM

## 2023-12-01 DIAGNOSIS — S31.159D: ICD-10-CM

## 2023-12-01 DIAGNOSIS — S80.01XD CONTUSION OF RIGHT KNEE, SUBSEQUENT ENCOUNTER: ICD-10-CM

## 2023-12-01 DIAGNOSIS — S71.151D: ICD-10-CM

## 2023-12-01 DIAGNOSIS — S81.802D WOUND OF LEFT LOWER EXTREMITY, SUBSEQUENT ENCOUNTER: ICD-10-CM

## 2023-12-01 DIAGNOSIS — S21.051D: ICD-10-CM

## 2023-12-01 PROCEDURE — 99213 OFFICE O/P EST LOW 20 MIN: CPT | Performed by: PAIN MEDICINE

## 2023-12-01 RX ORDER — LIDOCAINE 4 G/G
1 PATCH TOPICAL DAILY
Qty: 30 PATCH | Refills: 3 | Status: SHIPPED
Start: 2023-12-01 | End: 2023-12-01 | Stop reason: SDUPTHER

## 2023-12-01 RX ORDER — LIDOCAINE 4 G/G
1 PATCH TOPICAL DAILY
Qty: 30 PATCH | Refills: 3 | Status: SHIPPED | OUTPATIENT
Start: 2023-12-01 | End: 2024-03-30

## 2023-12-01 NOTE — PROGRESS NOTES
[address], [facility] [address]  [phone]    Follow up Note      Gabino Zhang     Date of Visit:  2023    CC:  Patient presents for follow up   Chief Complaint   Patient presents with    Pain     Left leg     HPI:    Pain is better. Appropriate analgesia with current medications regimen: yes - . Change in quality of symptoms:no. Medication side effects: yes with Lyrica  Recent diagnostic testing:none. Recent interventional procedures:none. .    She has not been on anticoagulation medications to include none. The patient  has not been on herbal supplements. The patient is diabetic. Imaging:   Left tib/fib  There is no evidence of fracture or dislocation. No significant  osteodegenerative or other osseous abnormalities are seen. No significant  surrounding soft tissue abnormality is identified. Previous treatments: Physical Therapy and medications. .         Potential Aberrant Drug-Related Behavior:    No    Urine Drug Screening:  None    OARRS report:  2023 consistent     Opioid Agreement:  Renewal date:N/A    Past Medical History:   Diagnosis Date    Dog bite     left lower leg, left arm, right breast, right abdomen, right knee, right thigh     Past Surgical History:   Procedure Laterality Date     SECTION      CHOLECYSTECTOMY      HERNIA REPAIR       Prior to Admission medications    Medication Sig Start Date End Date Taking?  Authorizing Provider   ibuprofen (ADVIL;MOTRIN) 800 MG tablet TAKE 1 TABLET BY MOUTH EVERY 6 TO 8 HOURS AS NEEDED FOR PAIN TAKE WITH FOOD 23  Yes ProviderJeana MD   levothyroxine (SYNTHROID) 25 MCG tablet Take 1 tablet by mouth daily   Yes ProviderJeana MD   OZEMPIC, 1 MG/DOSE, 4 MG/3ML SOPN INJECT 1MG UNDER THE SKIN ON THE SAME DAY EVERY WEEK 23  Yes Jeana Vences MD   traZODone (DESYREL) 50 MG tablet Take 1 tablet by mouth at bedtime 23  Yes Provider

## 2024-01-11 LAB
25(OH)D3 SERPL-MCNC: 34.6 NG/ML (ref 30–100)
ALBUMIN SERPL-MCNC: 4.1 G/DL (ref 3.5–5.2)
ALP SERPL-CCNC: 77 U/L (ref 35–104)
ALT SERPL-CCNC: 24 U/L (ref 0–32)
ANION GAP SERPL CALCULATED.3IONS-SCNC: 13 MMOL/L (ref 7–16)
AST SERPL-CCNC: 25 U/L (ref 0–31)
BILIRUB SERPL-MCNC: 0.5 MG/DL (ref 0–1.2)
BUN SERPL-MCNC: 13 MG/DL (ref 6–20)
CALCIUM SERPL-MCNC: 9.1 MG/DL (ref 8.6–10.2)
CHLORIDE SERPL-SCNC: 102 MMOL/L (ref 98–107)
CHOLEST SERPL-MCNC: 198 MG/DL
CO2 SERPL-SCNC: 21 MMOL/L (ref 22–29)
CREAT SERPL-MCNC: 0.7 MG/DL (ref 0.5–1)
ERYTHROCYTE [DISTWIDTH] IN BLOOD BY AUTOMATED COUNT: 13.8 % (ref 11.5–15)
GFR SERPL CREATININE-BSD FRML MDRD: >60 ML/MIN/1.73M2
GLUCOSE SERPL-MCNC: 101 MG/DL (ref 74–99)
HBA1C MFR BLD: 5.5 % (ref 4–5.6)
HCT VFR BLD AUTO: 39.6 % (ref 34–48)
HDLC SERPL-MCNC: 43 MG/DL
HGB BLD-MCNC: 12.7 G/DL (ref 11.5–15.5)
LDLC SERPL CALC-MCNC: 136 MG/DL
MCH RBC QN AUTO: 26.6 PG (ref 26–35)
MCHC RBC AUTO-ENTMCNC: 32.1 G/DL (ref 32–34.5)
MCV RBC AUTO: 82.8 FL (ref 80–99.9)
PLATELET # BLD AUTO: 601 K/UL (ref 130–450)
PMV BLD AUTO: 10.4 FL (ref 7–12)
POTASSIUM SERPL-SCNC: 4.5 MMOL/L (ref 3.5–5)
PROT SERPL-MCNC: 7.5 G/DL (ref 6.4–8.3)
RBC # BLD AUTO: 4.78 M/UL (ref 3.5–5.5)
SODIUM SERPL-SCNC: 136 MMOL/L (ref 132–146)
TRIGL SERPL-MCNC: 94 MG/DL
TSH SERPL DL<=0.05 MIU/L-ACNC: 2.1 UIU/ML (ref 0.27–4.2)
VLDLC SERPL CALC-MCNC: 19 MG/DL
WBC OTHER # BLD: 8.1 K/UL (ref 4.5–11.5)

## 2024-02-14 ENCOUNTER — TELEPHONE (OUTPATIENT)
Dept: FAMILY MEDICINE CLINIC | Age: 49
End: 2024-02-14

## 2024-02-14 NOTE — TELEPHONE ENCOUNTER
----- Message from Alberto Ponce sent at 2/14/2024  8:46 AM EST -----  Subject: Appointment Request    Reason for Call: New Patient/New to Provider Appointment needed: New   Patient Request Appointment    QUESTIONS    Reason for appointment request? No appointments available during search     Additional Information for Provider? Patients friend Lety Sigala is a   current patient and recommended Dr Moran. PLEASE CALL to discuss a NP   appt.   ---------------------------------------------------------------------------  --------------  CALL BACK INFO  [phone]; OK to leave message on voicemail  ---------------------------------------------------------------------------  --------------  SCRIPT ANSWERS

## 2024-04-05 ENCOUNTER — OFFICE VISIT (OUTPATIENT)
Dept: PAIN MANAGEMENT | Age: 49
End: 2024-04-05
Payer: COMMERCIAL

## 2024-04-05 VITALS
RESPIRATION RATE: 18 BRPM | HEART RATE: 82 BPM | DIASTOLIC BLOOD PRESSURE: 90 MMHG | HEIGHT: 64 IN | OXYGEN SATURATION: 99 % | TEMPERATURE: 97.3 F | BODY MASS INDEX: 32.44 KG/M2 | SYSTOLIC BLOOD PRESSURE: 122 MMHG | WEIGHT: 190 LBS

## 2024-04-05 DIAGNOSIS — S80.01XD CONTUSION OF RIGHT KNEE, SUBSEQUENT ENCOUNTER: ICD-10-CM

## 2024-04-05 DIAGNOSIS — S31.159D: ICD-10-CM

## 2024-04-05 DIAGNOSIS — G89.4 CHRONIC PAIN SYNDROME: Primary | ICD-10-CM

## 2024-04-05 DIAGNOSIS — S21.051D: ICD-10-CM

## 2024-04-05 DIAGNOSIS — S81.802D WOUND OF LEFT LOWER EXTREMITY, SUBSEQUENT ENCOUNTER: ICD-10-CM

## 2024-04-05 DIAGNOSIS — S51.852D OPEN BITE OF LEFT FOREARM, SUBSEQUENT ENCOUNTER: ICD-10-CM

## 2024-04-05 DIAGNOSIS — S71.151D: ICD-10-CM

## 2024-04-05 PROCEDURE — 99213 OFFICE O/P EST LOW 20 MIN: CPT | Performed by: PAIN MEDICINE

## 2024-04-05 RX ORDER — ROSUVASTATIN CALCIUM 10 MG/1
TABLET, COATED ORAL
COMMUNITY
Start: 2024-04-04 | End: 2024-04-05 | Stop reason: ALTCHOICE

## 2024-04-05 NOTE — PROGRESS NOTES
Carmen Banda presents to the [facility] on 4/5/2024. Carmen is complaining of pain left leg and left arm. The pain is intermittent. The pain is described as aching and burning. Pain is rated on her best day at a 0, on her worst day at a 8, and on average at a 2 on the VAS scale. She took her last dose of Motrin and Tylenol Tuesday.      Any procedures since your last visit: No  She is  on NSAIDS and  is not on anticoagulation medications to include none   Pacemaker or defibrillator: No   Medication Contract and Consent for Opioid Use Documents Filed        No documents found                       Temp 97.3 °F (36.3 °C) (Infrared)   Resp 18   Ht 1.626 m (5' 4\")   Wt 86.2 kg (190 lb)   BMI 32.61 kg/m²      No LMP recorded. Patient has had an implant.  
IU by IntraUTERine route   Yes Provider, MD Jeana   pregabalin (LYRICA) 75 MG capsule Take 1 capsule by mouth 2 times daily for 30 days. Take one capsule QHS for one week then one capsule BID as tolerated Max Daily Amount: 150 mg  Patient not taking: Reported on 12/1/2023 10/25/23 11/24/23  Ferny Rendon MD   gabapentin (NEURONTIN) 100 MG capsule  8/14/23   Provider, MD Jeana     Allergies   Allergen Reactions    Ciprofibrate Cough, Itching and Rash    Amoxicillin-Pot Clavulanate Anxiety, Cough and Itching    Keflex [Cephalexin] Rash     Social History     Socioeconomic History    Marital status: Single     Spouse name: Not on file    Number of children: Not on file    Years of education: Not on file    Highest education level: Not on file   Occupational History    Not on file   Tobacco Use    Smoking status: Never    Smokeless tobacco: Not on file   Substance and Sexual Activity    Alcohol use: No    Drug use: No    Sexual activity: Never   Other Topics Concern    Not on file   Social History Narrative    Not on file     Social Determinants of Health     Financial Resource Strain: Not on file   Food Insecurity: Not on file   Transportation Needs: Not on file   Physical Activity: Not on file   Stress: Not on file   Social Connections: Not on file   Intimate Partner Violence: Not on file   Housing Stability: Not on file     No family history on file.    REVIEW OF SYSTEMS:     Carmen denies fever/chills, chest pain, shortness of breath, new bowel or bladder complaints. All other review of systems was negative.    PHYSICAL EXAMINATION:      BP (!) 122/90   Pulse 82   Temp 97.3 °F (36.3 °C) (Infrared)   Resp 18   Ht 1.626 m (5' 4\")   Wt 86.2 kg (190 lb)   SpO2 99%   BMI 32.61 kg/m²     General:       General appearance:   pleasant and well-hydrated.   , in mild to moderate discomfort and A & O x3  Build:Overweight     HEENT:     Head:normocephalic and atraumatic  Sclera: icterus absent,      Lungs: